# Patient Record
Sex: FEMALE | Race: WHITE | NOT HISPANIC OR LATINO | Employment: UNEMPLOYED | URBAN - METROPOLITAN AREA
[De-identification: names, ages, dates, MRNs, and addresses within clinical notes are randomized per-mention and may not be internally consistent; named-entity substitution may affect disease eponyms.]

---

## 2022-08-11 ENCOUNTER — HOSPITAL ENCOUNTER (EMERGENCY)
Facility: HOSPITAL | Age: 22
Discharge: HOME | End: 2022-08-11
Attending: SPECIALIST
Payer: MEDICAID

## 2022-08-11 VITALS
HEART RATE: 87 BPM | RESPIRATION RATE: 16 BRPM | OXYGEN SATURATION: 98 % | WEIGHT: 184 LBS | BODY MASS INDEX: 32.6 KG/M2 | DIASTOLIC BLOOD PRESSURE: 76 MMHG | TEMPERATURE: 99 F | SYSTOLIC BLOOD PRESSURE: 119 MMHG | HEIGHT: 63 IN

## 2022-08-11 DIAGNOSIS — Z3A.27 27 WEEKS GESTATION OF PREGNANCY: Primary | ICD-10-CM

## 2022-08-11 PROCEDURE — 99283 EMERGENCY DEPT VISIT LOW MDM: CPT

## 2022-08-11 RX ORDER — HYDROXYPROGESTERONE CAPROATE 250 MG/ML
250 INJECTION INTRAMUSCULAR
Status: ON HOLD | COMMUNITY
End: 2022-09-13

## 2022-08-12 PROBLEM — Z3A.27 27 WEEKS GESTATION OF PREGNANCY: Status: ACTIVE | Noted: 2022-08-12

## 2022-08-12 NOTE — ED PROVIDER NOTES
"       MARIANELA NOTE     Admit Date: 2022  MARIANELA Physician: Alan Botello  Primary OBGYN: Dr. Alan Ricketts    Admit Diagnosis/Chief Complaint: Abdominal Pain    Chief Complaint   Patient presents with    Abdominal Pain    lost mucous plug       Hospital Course:  Kvng Rodriguez is a 22 y.o.  at 27w5d presents complaining of lower abdominal pressure pain.  She reports the pain is not consistent and is mainly on her left lower quadrant.  She also reports passing some mucus which she was concerned was her mucous plug.    This IUP is complicated by history of  delivery at 28 weeks..    Patient denies vaginal bleeding, leakage of fluid, contractions, headache, vision changes, RUQ pain, dysuria, fever and nausea/vomiting.  Fetal Movement: normal.    Review of Systems    /76 (BP Location: Left arm, Patient Position: Lying)   Pulse 87   Temp 98.6 °F (37 °C) (Oral)   Resp 16   Ht 5' 3" (1.6 m)   Wt 83.5 kg (184 lb)   SpO2 98%   Breastfeeding No   BMI 32.59 kg/m²   Temp:  [98.6 °F (37 °C)] 98.6 °F (37 °C)  Pulse:  [87] 87  Resp:  [16] 16  SpO2:  [98 %] 98 %  BP: (119)/(76) 119/76    General: in no apparent distress in no respiratory distress and acyanotic alert oriented times 3  Cardiovascular: regular rate and rhythm no murmurs  Respiratory: clear to auscultation, no wheezes, rales or rhonchi, symmetric air entry  Abdominal: fundus soft, nontender 28 weeks size FHT present.  Minor discomfort in the suprapubic and left lower quadrant.  Back: lumbar tenderness absent CVA tenderness none  Extremeties no redness or tenderness in the calves or thighs no edema    SSE:   SVE:  The cervix is long thick and closed.  Trace of vaginal discharge is noted       FHT: Category 1  TOCO: .  No contractions are noted.    Medical Decision Making:  Patient reassured about the normal discomforts of 3rd trimester pregnancy.  Signs and symptoms of pre term labor have been discussed.  Patient expressed understanding " and all questions were answered to her satisfaction.    LABS:   No results found for this or any previous visit (from the past 24 hour(s)).  [unfilled]     Imaging Results    None          ASSESMENT: Kvng Rodriguez is a 22 y.o.   at 27w5d with abdominal pressure consistent with fetal presentation.    Discharge Diagnosis:  Pregnancy 27 weeks.  Abdominal pressure or round ligament pain.    Status:Improved/stable    Disposition:  discharged to home    Medications:   Will no new medications    Patient Instructions:   Current Discharge Medication List      CONTINUE these medications which have NOT CHANGED    Details   HYDROXYprogesterone (PERLA) 250 mg/mL (1 mL) Oil Inject 250 mg into the muscle every 7 days.      prenatal vit 10-iron fum-folic (VITAFOL-OB) 65-1 mg Tab Take 1 tab by mouth daily  Qty: 30 tablet, Refills: 11    Associated Diagnoses: Prenatal care, subsequent pregnancy, second trimester             - Pt was given routine pregnancy instructions including to return to triage if she had any vaginal bleeding (other than spotting for the next 48hrs), any loss of fluid like her water broke, decreased fetal movement, or contractions Q 5min lasting for 2 or more hours. Pt was also instructed to drink copious water. Patient voiced understanding of all these instructions and was subsequently discharged home.    She will follow up with her primary OB.    No discharge procedures on file.    Alan Botello MD  OB-GYN Hospitalist       Alan Botello MD  21

## 2022-08-15 ENCOUNTER — HOSPITAL ENCOUNTER (INPATIENT)
Facility: HOSPITAL | Age: 22
LOS: 29 days | Discharge: HOME | End: 2022-09-13
Attending: OBSTETRICS & GYNECOLOGY | Admitting: OBSTETRICS & GYNECOLOGY
Payer: MEDICAID

## 2022-08-15 DIAGNOSIS — O42.919 PRETERM PREMATURE RUPTURE OF MEMBRANES (PPROM) WITH UNKNOWN ONSET OF LABOR: ICD-10-CM

## 2022-08-15 PROBLEM — Z3A.27 27 WEEKS GESTATION OF PREGNANCY: Status: RESOLVED | Noted: 2022-08-12 | Resolved: 2022-08-15

## 2022-08-15 LAB
ALBUMIN SERPL-MCNC: 2.9 GM/DL (ref 3.5–5)
ALBUMIN/GLOB SERPL: 0.9 RATIO (ref 1.1–2)
ALP SERPL-CCNC: 95 UNIT/L (ref 40–150)
ALT SERPL-CCNC: 12 UNIT/L (ref 0–55)
APPEARANCE UR: CLEAR
AST SERPL-CCNC: 12 UNIT/L (ref 5–34)
BACTERIA #/AREA URNS AUTO: ABNORMAL /HPF
BASOPHILS # BLD AUTO: 0.03 X10(3)/MCL (ref 0–0.2)
BASOPHILS NFR BLD AUTO: 0.2 %
BILIRUB UR QL STRIP.AUTO: NEGATIVE MG/DL
BILIRUBIN DIRECT+TOT PNL SERPL-MCNC: 0.8 MG/DL
BUN SERPL-MCNC: 6.3 MG/DL (ref 7–18.7)
CALCIUM SERPL-MCNC: 9 MG/DL (ref 8.4–10.2)
CHLORIDE SERPL-SCNC: 108 MMOL/L (ref 98–107)
CO2 SERPL-SCNC: 21 MMOL/L (ref 22–29)
COLOR UR AUTO: ABNORMAL
CREAT SERPL-MCNC: 0.62 MG/DL (ref 0.55–1.02)
CTP QC/QA: YES
EOSINOPHIL # BLD AUTO: 0.25 X10(3)/MCL (ref 0–0.9)
EOSINOPHIL NFR BLD AUTO: 1.8 %
ERYTHROCYTE [DISTWIDTH] IN BLOOD BY AUTOMATED COUNT: 13.1 % (ref 11.5–17)
GFR SERPLBLD CREATININE-BSD FMLA CKD-EPI: >60 MLS/MIN/1.73/M2
GLOBULIN SER-MCNC: 3.2 GM/DL (ref 2.4–3.5)
GLUCOSE SERPL-MCNC: 73 MG/DL (ref 74–100)
GLUCOSE UR QL STRIP.AUTO: NEGATIVE MG/DL
GROUP & RH: NORMAL
HBV SURFACE AG SERPL QL IA: NONREACTIVE
HCT VFR BLD AUTO: 38.2 % (ref 37–47)
HGB BLD-MCNC: 12.8 GM/DL (ref 12–16)
IMM GRANULOCYTES # BLD AUTO: 0.06 X10(3)/MCL (ref 0–0.04)
IMM GRANULOCYTES NFR BLD AUTO: 0.4 %
INDIRECT COOMBS GEL: NORMAL
KETONES UR QL STRIP.AUTO: NEGATIVE MG/DL
LEUKOCYTE ESTERASE UR QL STRIP.AUTO: ABNORMAL UNIT/L
LYMPHOCYTES # BLD AUTO: 1.78 X10(3)/MCL (ref 0.6–4.6)
LYMPHOCYTES NFR BLD AUTO: 13 %
MCH RBC QN AUTO: 31.7 PG (ref 27–31)
MCHC RBC AUTO-ENTMCNC: 33.5 MG/DL (ref 33–36)
MCV RBC AUTO: 94.6 FL (ref 80–94)
MONOCYTES # BLD AUTO: 0.84 X10(3)/MCL (ref 0.1–1.3)
MONOCYTES NFR BLD AUTO: 6.2 %
NEUTROPHILS # BLD AUTO: 10.7 X10(3)/MCL (ref 2.1–9.2)
NEUTROPHILS NFR BLD AUTO: 78.4 %
NITRITE UR QL STRIP.AUTO: NEGATIVE
NRBC BLD AUTO-RTO: 0 %
PH UR STRIP.AUTO: 6.5 [PH]
PLATELET # BLD AUTO: 246 X10(3)/MCL (ref 130–400)
PMV BLD AUTO: 10.7 FL (ref 7.4–10.4)
POTASSIUM SERPL-SCNC: 4 MMOL/L (ref 3.5–5.1)
PRENATAL STREP B CULTURE: NORMAL
PROT SERPL-MCNC: 6.1 GM/DL (ref 6.4–8.3)
PROT UR QL STRIP.AUTO: ABNORMAL MG/DL
RBC # BLD AUTO: 4.04 X10(6)/MCL (ref 4.2–5.4)
RBC #/AREA URNS AUTO: <5 /HPF
RBC UR QL AUTO: NEGATIVE UNIT/L
RPR: NEGATIVE
RUPTURE OF MEMBRANE: POSITIVE
SODIUM SERPL-SCNC: 136 MMOL/L (ref 136–145)
SP GR UR STRIP.AUTO: 1.02 (ref 1–1.03)
SQUAMOUS #/AREA URNS AUTO: <5 /HPF
T PALLIDUM AB SER QL: NONREACTIVE
UROBILINOGEN UR STRIP-ACNC: 1 MG/DL
WBC # SPEC AUTO: 13.7 X10(3)/MCL (ref 4.5–11.5)
WBC #/AREA URNS AUTO: 61 /HPF

## 2022-08-15 PROCEDURE — 72100003 HC LABOR CARE, EA. ADDL. 8 HRS

## 2022-08-15 PROCEDURE — 27000492 HC SLEEVE, SCD T/L

## 2022-08-15 PROCEDURE — 80053 COMPREHEN METABOLIC PANEL: CPT | Performed by: OBSTETRICS & GYNECOLOGY

## 2022-08-15 PROCEDURE — 85025 COMPLETE CBC W/AUTO DIFF WBC: CPT | Performed by: OBSTETRICS & GYNECOLOGY

## 2022-08-15 PROCEDURE — 87077 CULTURE AEROBIC IDENTIFY: CPT | Performed by: OBSTETRICS & GYNECOLOGY

## 2022-08-15 PROCEDURE — 72100002 HC LABOR CARE, 1ST 8 HOURS

## 2022-08-15 PROCEDURE — 86850 RBC ANTIBODY SCREEN: CPT | Performed by: OBSTETRICS & GYNECOLOGY

## 2022-08-15 PROCEDURE — 25000003 PHARM REV CODE 250: Performed by: OBSTETRICS & GYNECOLOGY

## 2022-08-15 PROCEDURE — 87081 CULTURE SCREEN ONLY: CPT | Performed by: OBSTETRICS & GYNECOLOGY

## 2022-08-15 PROCEDURE — 36415 COLL VENOUS BLD VENIPUNCTURE: CPT | Performed by: OBSTETRICS & GYNECOLOGY

## 2022-08-15 PROCEDURE — 86780 TREPONEMA PALLIDUM: CPT | Performed by: OBSTETRICS & GYNECOLOGY

## 2022-08-15 PROCEDURE — 63700000 PHARM REV CODE 250 ALT 637 W/O HCPCS: Performed by: OBSTETRICS & GYNECOLOGY

## 2022-08-15 PROCEDURE — 87389 HIV-1 AG W/HIV-1&-2 AB AG IA: CPT | Performed by: OBSTETRICS & GYNECOLOGY

## 2022-08-15 PROCEDURE — 11000001 HC ACUTE MED/SURG PRIVATE ROOM

## 2022-08-15 PROCEDURE — 63600175 PHARM REV CODE 636 W HCPCS: Performed by: OBSTETRICS & GYNECOLOGY

## 2022-08-15 PROCEDURE — 51702 INSERT TEMP BLADDER CATH: CPT

## 2022-08-15 PROCEDURE — 81001 URINALYSIS AUTO W/SCOPE: CPT | Performed by: OBSTETRICS & GYNECOLOGY

## 2022-08-15 PROCEDURE — 87340 HEPATITIS B SURFACE AG IA: CPT | Performed by: OBSTETRICS & GYNECOLOGY

## 2022-08-15 PROCEDURE — 99285 EMERGENCY DEPT VISIT HI MDM: CPT | Mod: 25

## 2022-08-15 RX ORDER — MAGNESIUM SULFATE HEPTAHYDRATE 40 MG/ML
6 INJECTION, SOLUTION INTRAVENOUS ONCE
Status: COMPLETED | OUTPATIENT
Start: 2022-08-15 | End: 2022-08-15

## 2022-08-15 RX ORDER — SODIUM CHLORIDE, SODIUM LACTATE, POTASSIUM CHLORIDE, CALCIUM CHLORIDE 600; 310; 30; 20 MG/100ML; MG/100ML; MG/100ML; MG/100ML
INJECTION, SOLUTION INTRAVENOUS CONTINUOUS
Status: DISCONTINUED | OUTPATIENT
Start: 2022-08-15 | End: 2022-09-10

## 2022-08-15 RX ORDER — SODIUM CHLORIDE 0.9 % (FLUSH) 0.9 %
10 SYRINGE (ML) INJECTION
Status: DISCONTINUED | OUTPATIENT
Start: 2022-08-15 | End: 2022-09-13 | Stop reason: HOSPADM

## 2022-08-15 RX ORDER — MAGNESIUM SULFATE HEPTAHYDRATE 40 MG/ML
2 INJECTION, SOLUTION INTRAVENOUS CONTINUOUS
Status: DISCONTINUED | OUTPATIENT
Start: 2022-08-15 | End: 2022-09-01

## 2022-08-15 RX ORDER — AZITHROMYCIN 250 MG/1
1000 TABLET, FILM COATED ORAL DAILY
Status: DISCONTINUED | OUTPATIENT
Start: 2022-08-15 | End: 2022-08-16

## 2022-08-15 RX ORDER — HYDROXYPROGESTERONE CAPROATE 250 MG/ML
250 INJECTION INTRAMUSCULAR WEEKLY
Status: DISCONTINUED | OUTPATIENT
Start: 2022-08-18 | End: 2022-09-11

## 2022-08-15 RX ORDER — AMOXICILLIN 500 MG/1
500 CAPSULE ORAL EVERY 8 HOURS
Status: COMPLETED | OUTPATIENT
Start: 2022-08-17 | End: 2022-08-21

## 2022-08-15 RX ORDER — BETAMETHASONE SODIUM PHOSPHATE AND BETAMETHASONE ACETATE 3; 3 MG/ML; MG/ML
12 INJECTION, SUSPENSION INTRA-ARTICULAR; INTRALESIONAL; INTRAMUSCULAR; SOFT TISSUE EVERY 24 HOURS
Status: COMPLETED | OUTPATIENT
Start: 2022-08-15 | End: 2022-08-16

## 2022-08-15 RX ADMIN — AZITHROMYCIN MONOHYDRATE 1000 MG: 250 TABLET ORAL at 11:08

## 2022-08-15 RX ADMIN — MAGNESIUM SULFATE HEPTAHYDRATE 6 G: 40 INJECTION, SOLUTION INTRAVENOUS at 10:08

## 2022-08-15 RX ADMIN — AMPICILLIN SODIUM 2 G: 2 INJECTION, POWDER, FOR SOLUTION INTRAMUSCULAR; INTRAVENOUS at 11:08

## 2022-08-15 RX ADMIN — AMPICILLIN SODIUM 2 G: 2 INJECTION, POWDER, FOR SOLUTION INTRAMUSCULAR; INTRAVENOUS at 10:08

## 2022-08-15 RX ADMIN — AMPICILLIN SODIUM 2 G: 2 INJECTION, POWDER, FOR SOLUTION INTRAMUSCULAR; INTRAVENOUS at 04:08

## 2022-08-15 RX ADMIN — BETAMETHASONE ACETATE AND BETAMETHASONE SODIUM PHOSPHATE 12 MG: 3; 3 INJECTION, SUSPENSION INTRA-ARTICULAR; INTRALESIONAL; INTRAMUSCULAR; SOFT TISSUE at 10:08

## 2022-08-15 RX ADMIN — SODIUM CHLORIDE, POTASSIUM CHLORIDE, SODIUM LACTATE AND CALCIUM CHLORIDE: 600; 310; 30; 20 INJECTION, SOLUTION INTRAVENOUS at 11:08

## 2022-08-15 RX ADMIN — SODIUM CHLORIDE, POTASSIUM CHLORIDE, SODIUM LACTATE AND CALCIUM CHLORIDE: 600; 310; 30; 20 INJECTION, SOLUTION INTRAVENOUS at 10:08

## 2022-08-15 NOTE — ED PROVIDER NOTES
"       MARIANELA NOTE  [facility]     Admit Date: 8/15/2022  MARIANELA Physician: Trice Oh  Primary OBGYN: Dr. Alan Ricketts and mitra Ovalle MFM    Admit Diagnosis/Chief Complaint: Leakage of Fluid    Chief Complaint   Patient presents with    Rupture of Membranes     Iup at 28.2 with c/o leaking of fluid       Hospital Course:  Kvng Rodriguez is a 22 y.o.  at 28w2d presents complaining of LOF at 7am clear this am. Some abdominal discomfort, no severe pain, not described as contractions.   This IUP is complicated by hx 28 wk PTL/PTD, on dottie q Thurs, followed by MFM, BMI 32.    Patient denies vaginal bleeding, contractions, headache, vision changes, RUQ pain, dysuria, fever and nausea/vomiting.  Fetal Movement: normal.    /86   Pulse 97   Ht 5' 3" (1.6 m)   Wt 83.9 kg (185 lb)   Breastfeeding No   BMI 32.77 kg/m²   Pulse:  [97] 97  BP: (121)/(86) 121/86    General: in no apparent distress well developed and well nourished non-toxic in no respiratory distress and acyanotic alert oriented times 3 afebrile normal vitals  Cardiovascular: regular rate and rhythm no murmurs  Respiratory: unlabored  Abdominal: soft, nontender, nondistended, no abnormal masses, no epigastric pain obese FHT present  Back: lumbar tenderness absent CVA tenderness none suprapubic tenderness absent  Extremeties no redness or tenderness in the calves or thighs no edema    SVE (PeriWATCH)  Dilation (cm): 1  Effacement (%): 50  Station: -2  Examined by:: sumaya oh md  Simplified Michelle Score: 3     SSE neg pooling, neg valsalva, mucous dc noted, visually 1cm dilated and thick  +++ROMplus, vtx palpated on exam      EFM: Cat 1, 155 modBTV, +accel, no decel (reassuring, reactive)  TOCO: none      Medical Decision Making:      LABS:     Recent Results (from the past 24 hour(s))   POCT Rupture of membrane    Collection Time: 08/15/22  9:27 AM   Result Value Ref Range    Rupture of Membrane Positive (A) " Negative     Acceptable Yes        Imaging Results    None          ASSESMENT: Kvng Rodriguez is a 22 y.o.   at 28w2d with PPROM    Discharge Diagnosis:   Patient Active Problem List   Diagnosis     premature rupture of membranes (PPROM) with unknown onset of labor     Admit to primary OB, MFM/NICU consult  Latency abx, BMZ, Mag neuroppx  Continue dottie q Thurs, per Dr. Ovalle  Ultrasound ordered  Continue to closely monitor maternal/fetal status  Overall reassuring on NST  Answered pt questions to best of ability      Trice Oh MD  OB-GYN Hospitalist

## 2022-08-15 NOTE — H&P
[facility] - Ante[facility]  Obstetrics  History & Physical    Patient Name: Kvng Rodriguez  MRN: [id number]  Admission Date: 8/15/2022  Primary Care Provider: No primary care provider on file.    Subjective:     Principal Problem: premature rupture of membranes (PPROM) with unknown onset of labor    History of Present Illness: pprom     Obstetric HPI:  Patient reports None contractions, active fetal movement, Yes vaginal bleeding , Yes loss of fluid     This pregnancy has been complicated by hx of ptl   pprom    OB History    Para Term  AB Living   4 1 0 1 2 1   SAB IAB Ectopic Multiple Live Births   2 0 0 0 1      # Outcome Date GA Lbr Grupo/2nd Weight Sex Delivery Anes PTL Lv   4 Current            3 SAB 21        FD   2  19 28w0d   M Vag-Spont   MAGDALENO   1 2018        FD     History reviewed. No pertinent past medical history.  Past Surgical History:   Procedure Laterality Date    ADENOIDECTOMY      MYRINGOTOMY W/ TUBES      TONSILLECTOMY         PTA Medications   Medication Sig    HYDROXYprogesterone (PERLA) 250 mg/mL (1 mL) Oil Inject 250 mg into the muscle every 7 days.    prenatal vit 10-iron fum-folic (VITAFOL-OB) 65-1 mg Tab Take 1 tab by mouth daily       Review of patient's allergies indicates:  No Known Allergies     Family History     Problem Relation (Age of Onset)    COPD Mother    Diabetes Mother    Hypertension Son        Tobacco Use    Smoking status: Never Smoker    Smokeless tobacco: Never Used   Substance and Sexual Activity    Alcohol use: Not Currently    Drug use: Never    Sexual activity: Yes     Review of Systems   Objective:     Vital Signs (Most Recent):  Temp: 98 °F (36.7 °C) (08/15/22 1133)  Pulse: 82 (08/15/22 1140)  BP: (!) 87/59 (08/15/22 1140)  SpO2: 100 % (08/15/22 1133) Vital Signs (24h Range):  Temp:  [98 °F (36.7 °C)] 98 °F (36.7 °C)  Pulse:  [] 82  SpO2:  [97 %-100 %] 100 %  BP: ()/(51-86) 87/59      Weight: 83.9 kg (185 lb)  Body mass index is 32.77 kg/m².    FHT: 140Cat 1 (reassuring)  TOCO:  Q 0 minutes    Physical Exam    Cervix:  Dilation:  1  Effacement:  50%  Station: -3  Presentation: Vertex     Significant Labs:  Lab Results   Component Value Date    GROUPTRH O POS 08/15/2022       I have personallly reviewed all pertinent lab results from the last 24 hours.    Assessment/Plan:     22 y.o. female  at 28w2d for:    Active Diagnoses:    Diagnosis Date Noted POA    PRINCIPAL PROBLEM:   premature rupture of membranes (PPROM) with unknown onset of labor [O42.919] 08/15/2022 Yes      Problems Resolved During this Admission:       Admit to [facility]  mfm consult  Steroids  abx  Monitoring  Notify nicu    Alan Ricketts MD  Obstetrics  [facility]

## 2022-08-15 NOTE — PROGRESS NOTES
08/15/22 1155   [facility] [place] Note - Strip   Strip Reviewed by [place] Nurse? Yes   [facility] [place] Note - Communication   [place] Nurse Communicated with Bedside Nurse Regarding: Fetal Status   [facility] [place] Note - Notification   Nurse Notified? Yes  (nurse at bedside)   Name of Nurse Delacruz

## 2022-08-15 NOTE — PROCEDURE NOTE ADDENDUM
LIMITED OBSTETRICAL ULTRASOUND AND BIOPHYSICAL PROFILE REPORT    DATE OF ULTRASOUND: 08/15/2022     INDICATION: PROM     Gestational Age: 28w2d     Limited obstetrical ultrasound was done that showed fetus to be in cephalic presentation longitudinal lie.  Placenta was anterior.  BPD was 7.2 cm at 29 weeks, head circumference 25.8 cm at 28 weeks, abdominal circumference was 24 cm at 28 weeks 2 days, and femur length was 5.5 cm at 29 weeks.  Estimated fetal weight of 1223 g (2 lb 12 oz is at the 55th percentile).  ЕЛЕНА was normal at 5.7 with deepest pocket of 2.7 cm.    BPP was done that showed normal fetal movements, tone, fluid, and no breathing with a biophysical profile of 6/8.    Impression:      28 weeks and 2 days gestation with normal fetal growth and low-normal amniotic fluid volume.    Biophysical profile is 6/8.           Parmjit Ovalle MD       Components of this note were documented using voice recognition systems; and are subject to errors not corrected at proof reading. Please contact author for any clarifications.'

## 2022-08-16 LAB
RUBV IGG SERPL IA-ACNC: 3.1
RUBV IGG SERPL QL IA: POSITIVE

## 2022-08-16 PROCEDURE — 63600175 PHARM REV CODE 636 W HCPCS: Performed by: OBSTETRICS & GYNECOLOGY

## 2022-08-16 PROCEDURE — 25000003 PHARM REV CODE 250: Performed by: NURSE PRACTITIONER

## 2022-08-16 PROCEDURE — 11000001 HC ACUTE MED/SURG PRIVATE ROOM

## 2022-08-16 PROCEDURE — 72100003 HC LABOR CARE, EA. ADDL. 8 HRS

## 2022-08-16 PROCEDURE — 25000003 PHARM REV CODE 250: Performed by: OBSTETRICS & GYNECOLOGY

## 2022-08-16 RX ORDER — ASPIRIN 81 MG/1
81 TABLET ORAL DAILY
Status: DISCONTINUED | OUTPATIENT
Start: 2022-08-16 | End: 2022-09-11

## 2022-08-16 RX ORDER — AZITHROMYCIN 250 MG/1
1000 TABLET, FILM COATED ORAL DAILY
Status: DISCONTINUED | OUTPATIENT
Start: 2022-08-19 | End: 2022-08-22

## 2022-08-16 RX ADMIN — AMPICILLIN SODIUM 2 G: 2 INJECTION, POWDER, FOR SOLUTION INTRAMUSCULAR; INTRAVENOUS at 04:08

## 2022-08-16 RX ADMIN — BETAMETHASONE ACETATE AND BETAMETHASONE SODIUM PHOSPHATE 12 MG: 3; 3 INJECTION, SUSPENSION INTRA-ARTICULAR; INTRALESIONAL; INTRAMUSCULAR; SOFT TISSUE at 10:08

## 2022-08-16 RX ADMIN — AMPICILLIN SODIUM 2 G: 2 INJECTION, POWDER, FOR SOLUTION INTRAMUSCULAR; INTRAVENOUS at 10:08

## 2022-08-16 RX ADMIN — AMPICILLIN SODIUM 2 G: 2 INJECTION, POWDER, FOR SOLUTION INTRAMUSCULAR; INTRAVENOUS at 05:08

## 2022-08-16 RX ADMIN — ASPIRIN 81 MG: 81 TABLET, COATED ORAL at 09:08

## 2022-08-16 RX ADMIN — SODIUM CHLORIDE, POTASSIUM CHLORIDE, SODIUM LACTATE AND CALCIUM CHLORIDE: 600; 310; 30; 20 INJECTION, SOLUTION INTRAVENOUS at 05:08

## 2022-08-16 RX ADMIN — AMPICILLIN SODIUM 2 G: 2 INJECTION, POWDER, FOR SOLUTION INTRAMUSCULAR; INTRAVENOUS at 11:08

## 2022-08-16 NOTE — PROGRESS NOTES
Pt seen this am no complaints. +lof reports good FM   Denies bleeding  vss afebrile  aao3  abd gravid nt   Ext tn    nst cat one  toco quiet      iup 28-3 pprom  Stable cw abx  Monitoring  Appreciate Dr Ovalle

## 2022-08-16 NOTE — CONSULTS
Kvng Rodriguez is a 22 y.o.  female  at 28w3d  gestation who was admitted to antepartum with  premature rupture of membranes that occurred on 8-15-22. She is known patient to [facility] being followed for history of PTD. She is on weekly [place]. She is s/p magnesium sulfate for fetal neuroprotection. She received first celestone yesterday. She is on IV antibiotics currently. She reports good fetal movement. She reports feeling about one contraction per hour and some clear fluid. She denies any vaginal bleeding.      Review of Systems   Constitutional: Negative.    Eyes: Negative.    Respiratory: Negative.    Cardiovascular: Negative.    Gastrointestinal: Negative.    Endocrine: Negative.    Genitourinary: Negative.    Musculoskeletal: Negative.    Skin: Negative.    Allergic/Immunologic: Negative.    Neurological: Negative.    Hematological: Negative.    Psychiatric/Behavioral: Negative.        History reviewed. No pertinent past medical history.   Past Surgical History:   Procedure Laterality Date    ADENOIDECTOMY      MYRINGOTOMY W/ TUBES      TONSILLECTOMY        Social History     Socioeconomic History    Marital status: Single   Tobacco Use    Smoking status: Never Smoker    Smokeless tobacco: Never Used   Substance and Sexual Activity    Alcohol use: Not Currently    Drug use: Never    Sexual activity: Yes      Family History   Problem Relation Age of Onset    Diabetes Mother     COPD Mother     Hypertension Son       Review of patient's allergies indicates:  No Known Allergies   Prior to Admission medications    Medication Sig Start Date End Date Taking? Authorizing Provider   HYDROXYprogesterone (PERLA) 250 mg/mL (1 mL) Oil Inject 250 mg into the muscle every 7 days.    Historical Provider   prenatal vit 10-iron fum-folic (VITAFOL-OB) 65-1 mg Tab Take 1 tab by mouth daily 5/3/22   Alan Ricketts MD            Vitals:    08/15/22 2130 08/15/22 2300 22 0130 22 0430   BP:  "126/74   (!) 100/58   Pulse: 91   75   Resp:       Temp:  98.2 °F (36.8 °C) 97.9 °F (36.6 °C) 97.9 °F (36.6 °C)   SpO2:    100%   Weight:       Height:        No LMP recorded. Patient is pregnant.   HT: 5' 3" (160 cm)  WT: 83.9 kg (185 lb)  BMI: 32.8     Temp:  [97.9 °F (36.6 °C)-98.5 °F (36.9 °C)] 97.9 °F (36.6 °C)  Pulse:  [0-163] 75  Resp:  [18] 18  SpO2:  [93 %-100 %] 100 %  BP: ()/(51-86) 100/58  Physical Exam  Constitutional:       General: She is not in acute distress.  Cardiovascular:      Rate and Rhythm: Normal rate and regular rhythm.      Pulses: Normal pulses.      Heart sounds: Normal heart sounds.   Pulmonary:      Effort: Pulmonary effort is normal. No respiratory distress.      Breath sounds: Normal breath sounds.   Abdominal:      Palpations: Abdomen is soft.      Tenderness: There is no abdominal tenderness.      Comments: Gravid uterus, no uterine tenderness   Musculoskeletal:      Right lower leg: No edema.      Left lower leg: No edema.   Skin:     General: Skin is warm and dry.      Capillary Refill: Capillary refill takes less than 2 seconds.   Neurological:      General: No focal deficit present.      Mental Status: She is alert and oriented to person, place, and time.   Psychiatric:         Mood and Affect: Mood normal.         Behavior: Behavior normal.         Thought Content: Thought content normal.       Recent Results (from the past 48 hour(s))   RPR    Collection Time: 08/15/22 12:00 AM   Result Value Ref Range    RPR Negative    Strep B Screen, Vaginal / Rectal    Collection Time: 08/15/22 12:00 AM    Specimen: Vaginal/Rectal   Result Value Ref Range    Strep B Culture Pending    POCT Rupture of membrane    Collection Time: 08/15/22  9:27 AM   Result Value Ref Range    Rupture of Membrane Positive (A) Negative     Acceptable Yes    Strep Only Culture    Collection Time: 08/15/22  9:58 AM    Specimen: Vagina   Result Value Ref Range    Strep Only Culture No growth " of Beta Strep    Urinalysis    Collection Time: 08/15/22  9:58 AM   Result Value Ref Range    Color, UA Dark Yellow (A) Yellow, Colorless, Other, Clear    Appearance, UA Clear Clear    Specific Gravity, UA 1.023 1.001 - 1.030    pH, UA 6.5 5.0, 5.5, 6.0, 6.5, 7.0, 7.5, 8.0, 8.5    Protein, UA Trace (A) Negative, 300  mg/dL    Glucose, UA Negative Negative, Normal mg/dL    Ketones, UA Negative Negative, +1, +2, +3, +4, +5, >=160, >=80 mg/dL    Blood, UA Negative Negative unit/L    Bilirubin, UA Negative Negative mg/dL    Urobilinogen, UA 1.0 0.2, 1.0, Normal mg/dL    Nitrites, UA Negative Negative    Leukocyte Esterase, UA 2+ (A) Negative, 75  unit/L   Urine Culture High Risk    Collection Time: 08/15/22  9:58 AM    Specimen: Urine, Clean Catch   Result Value Ref Range    Urine Culture No Growth At 24 Hours    Comprehensive Metabolic Panel    Collection Time: 08/15/22  9:58 AM   Result Value Ref Range    Sodium Level 136 136 - 145 mmol/L    Potassium Level 4.0 3.5 - 5.1 mmol/L    Chloride 108 (H) 98 - 107 mmol/L    Carbon Dioxide 21 (L) 22 - 29 mmol/L    Glucose Level 73 (L) 74 - 100 mg/dL    Blood Urea Nitrogen 6.3 (L) 7.0 - 18.7 mg/dL    Creatinine 0.62 0.55 - 1.02 mg/dL    Calcium Level Total 9.0 8.4 - 10.2 mg/dL    Protein Total 6.1 (L) 6.4 - 8.3 gm/dL    Albumin Level 2.9 (L) 3.5 - 5.0 gm/dL    Globulin 3.2 2.4 - 3.5 gm/dL    Albumin/Globulin Ratio 0.9 (L) 1.1 - 2.0 ratio    Bilirubin Total 0.8 <=1.5 mg/dL    Alkaline Phosphatase 95 40 - 150 unit/L    Alanine Aminotransferase 12 0 - 55 unit/L    Aspartate Aminotransferase 12 5 - 34 unit/L    eGFR >60 mls/min/1.73/m2   Type & Screen    Collection Time: 08/15/22  9:58 AM   Result Value Ref Range    Group & Rh O POS     Indirect [place] GEL NEG    CBC with Differential    Collection Time: 08/15/22  9:58 AM   Result Value Ref Range    WBC 13.7 (H) 4.5 - 11.5 x10(3)/mcL    RBC 4.04 (L) 4.20 - 5.40 x10(6)/mcL    Hgb 12.8 12.0 - 16.0 gm/dL    Hct 38.2 37.0 - 47.0 %     MCV 94.6 (H) 80.0 - 94.0 fL    MCH 31.7 (H) 27.0 - 31.0 pg    MCHC 33.5 33.0 - 36.0 mg/dL    RDW 13.1 11.5 - 17.0 %    Platelet 246 130 - 400 x10(3)/mcL    MPV 10.7 (H) 7.4 - 10.4 fL    Neut % 78.4 %    Lymph % 13.0 %    Mono % 6.2 %    Eos % 1.8 %    Basophil % 0.2 %    Lymph # 1.78 0.6 - 4.6 x10(3)/mcL    Neut # 10.7 (H) 2.1 - 9.2 x10(3)/mcL    Mono # 0.84 0.1 - 1.3 x10(3)/mcL    Eos # 0.25 0 - 0.9 x10(3)/mcL    Baso # 0.03 0 - 0.2 x10(3)/mcL    IG# 0.06 (H) 0 - 0.04 x10(3)/mcL    IG% 0.4 %    NRBC% 0.0 %   Urinalysis, Microscopic    Collection Time: 08/15/22  9:58 AM   Result Value Ref Range    RBC, UA <5 <=5 /HPF    WBC, UA 61 (H) <=5 /HPF    Squamous Epithelial Cells, UA <5 <=5 /HPF    Bacteria, UA None Seen None Seen, Rare, Occasional /HPF   SYPHILIS ANTIBODY (WITH REFLEX RPR)    Collection Time: 08/15/22 11:06 AM   Result Value Ref Range    Syphilis Antibody Nonreactive Nonreactive, Equivocal   Hepatitis B Surface Antigen    Collection Time: 08/15/22 11:06 AM   Result Value Ref Range    Hepatitis B Surface Antigen Nonreactive Nonreactive      US OB Non Rad Limited 1 Or More Gestations  See Provider Notes for results.     IMPRESSION: Please see provider notes for interpretation    This procedure was auto-finalized by: Virtual Radiologist  US OB Non Rad 14+ Weeks TransAbd, w/Biophysical Profile, w/o NST (xpd)  See Provider Notes for results.     IMPRESSION: Please see provider notes for interpretation    This procedure was auto-finalized by: Virtual Radiologist      Assessment and Plan  28w3d  premature rupture of membranes   baseline, moderate variability, 15x15 accels, infrequent variable decels, no contractions  Discussed with her the plan of care with broad spectrum antibiotics to prolong the latent phase, steroids to enhance fetal lunch maturity and magnesium sulfate for neuroprotection. Explained that delivery is planned at 34 weeks unless required earlier for onset of labor, nonreassuring  FHTs, evidence of chorioamnionitis, placental abruption or cord prolapse. Patient was advised that immediately delivery carries with it a higher risk of  morbidity/mortality than expectant management, understanding the risk of fetal demise in uterus, even in hospital secondary to occurrences such as placental abruption or cord prolapse that could not be picked up in a timely fashion to save the child. However, immediate delivery carries much higher risk than the small risk of such occurrence. The patient is in agreement with plan of expectant management.    Second celestone due today. Antibiotics per protocol.   BPP yesterday with preliminary read of 6/8 (0 breathing) and Estimated fetal weight of 1223 g (2 lb 12 oz is at the 55th percentile) Will do biophysical profile daily at this time. Continuous fetal monitoring at this time.     VTE prophylaxis  Discussed risks of DVT with prolonged bedrest. Discussed risks/benefits of SCD use vs frequent ROM and exercise of BLE while in bed to decrease risk of DVT.  Agreed to do SCDs  NICU consult pending      I examined the patient around 8:00 a.m..  It was no uterine tenderness.  Biophysical profile read later was 6/10.  Will keep on continuous monitoring.  Explained to the patient that historically delivery was done with premature rupture membranes at 34 weeks gestation.  She had her that recent data suggests that could wait beyond 34 weeks, with improved fetal benefits that has to be weighed against the risk of infection and bleeding.  with biophysical profile 6/10 and decreasing ЕЛЕНА at 4 today, there is risk of infection likely need for earlier delivery.  However there is no clinical evidence of infection at this time with no uterine tenderness continue daily testing and continuous monitoring and reassess daily    Components of this note were documented using voice recognition systems; and are subject to errors not corrected at proof reading.  Please contact the  author for any clarifications.

## 2022-08-16 NOTE — PROCEDURE NOTE ADDENDUM
BIOPHYSICAL PROFILE  REPORT    DATE OF ULTRASOUND: 2022     INDICATION: PROM    Gestational Age: 28w3d     NST:  2/2 125 beats per minute during rounds with accelerations of 15 x 15  Movement: 2/2  Tone:  2/2  Fluid:  0/2  Breathin/2    FHR:  125 bpm  during ultrasound    ЕЛЕНА:  4.0    Presentation: Cephalic        Impression:  Biophysical profile 6/10 with oligohydramnios        Parmjit Ovalle MD       Components of this note were documented using voice recognition systems; and are subject to errors not corrected at proof reading. Please contact author for any clarifications.

## 2022-08-17 LAB — BACTERIA SPEC CULT: NORMAL

## 2022-08-17 PROCEDURE — 25000003 PHARM REV CODE 250: Performed by: OBSTETRICS & GYNECOLOGY

## 2022-08-17 PROCEDURE — 25000003 PHARM REV CODE 250: Performed by: NURSE PRACTITIONER

## 2022-08-17 PROCEDURE — 11000001 HC ACUTE MED/SURG PRIVATE ROOM

## 2022-08-17 PROCEDURE — 63600175 PHARM REV CODE 636 W HCPCS: Performed by: OBSTETRICS & GYNECOLOGY

## 2022-08-17 PROCEDURE — 72100003 HC LABOR CARE, EA. ADDL. 8 HRS

## 2022-08-17 RX ADMIN — AMOXICILLIN 500 MG: 500 CAPSULE ORAL at 06:08

## 2022-08-17 RX ADMIN — AMOXICILLIN 500 MG: 500 CAPSULE ORAL at 08:08

## 2022-08-17 RX ADMIN — AMPICILLIN SODIUM 2 G: 2 INJECTION, POWDER, FOR SOLUTION INTRAMUSCULAR; INTRAVENOUS at 05:08

## 2022-08-17 RX ADMIN — AMOXICILLIN 500 MG: 500 CAPSULE ORAL at 01:08

## 2022-08-17 RX ADMIN — ASPIRIN 81 MG: 81 TABLET, COATED ORAL at 09:08

## 2022-08-17 NOTE — PROGRESS NOTES
22 year old  at 28 4/7 weeks gestation, currently admitted with  premature rupture of membranes. She is s/p Celstone x 2 on 8-15 and 22 and s/p magnesium sulfate for neuroprotection. She has history of PTD in last pregnancy at 28 weeks. She is on weekly Perla (on ). She is on prophylactic baby aspirin for preeclampsia prophylaxis. Reports still leaking clear fluid; also reports cramps every 10 minutes or so.  Denies vaginal bleeding, headaches, visual disturbances, epigastric pain, decreased fetal movement.    Review of Systems   Constitutional: Negative for fever.   Eyes: Negative for visual disturbance.   Respiratory: Negative for shortness of breath.    Cardiovascular: Negative for chest pain.   Gastrointestinal: Negative for abdominal pain.   Genitourinary: Negative for vaginal bleeding.   Neurological: Negative for headaches.     [unfilled]    History reviewed. No pertinent past medical history.   Past Surgical History:   Procedure Laterality Date    ADENOIDECTOMY      MYRINGOTOMY W/ TUBES      TONSILLECTOMY        Social History     Socioeconomic History    Marital status: Single   Tobacco Use    Smoking status: Never Smoker    Smokeless tobacco: Never Used   Substance and Sexual Activity    Alcohol use: Not Currently    Drug use: Never    Sexual activity: Yes      Family History   Problem Relation Age of Onset    Diabetes Mother     COPD Mother     Hypertension Son       Review of patient's allergies indicates:  No Known Allergies   Prior to Admission medications    Medication Sig Start Date End Date Taking? Authorizing Provider   HYDROXYprogesterone (PERLA) 250 mg/mL (1 mL) Oil Inject 250 mg into the muscle every 7 days.    Historical Provider   prenatal vit 10-iron fum-folic (VITAFOL-OB) 65-1 mg Tab Take 1 tab by mouth daily 5/3/22   Alan Ricketts MD            Vitals:    22 1910 22 2240 22 0335 22 0701   BP: 110/61 (!) 91/57 (!) 85/47  "   Pulse: 77 70 62    Resp: 18  16    Temp: 98.2 °F (36.8 °C) 97.9 °F (36.6 °C) 98.2 °F (36.8 °C) 98.3 °F (36.8 °C)   TempSrc:    Oral   SpO2: 98% 97% 97%    Weight:       Height:        No LMP recorded. Patient is pregnant.   HT: 5' 3" (160 cm)  WT: 83.9 kg (185 lb)  BMI: 32.8     Temp:  [97.8 °F (36.6 °C)-98.8 °F (37.1 °C)] 98.3 °F (36.8 °C)  Pulse:  [62-96] 62  Resp:  [16-18] 16  SpO2:  [97 %-100 %] 97 %  BP: ()/(47-74) 85/47  Physical Exam  Constitutional:       Appearance: Normal appearance.   Cardiovascular:      Rate and Rhythm: Normal rate.   Pulmonary:      Effort: Pulmonary effort is normal.   Abdominal:      Palpations: Abdomen is soft.      Comments: Nontender, gravid uterus   Musculoskeletal:         General: Normal range of motion.   Skin:     General: Skin is warm and dry.   Neurological:      Mental Status: She is alert and oriented to person, place, and time.   Psychiatric:         Mood and Affect: Mood normal.       Recent Results (from the past 48 hour(s))   POCT Rupture of membrane    Collection Time: 08/15/22  9:27 AM   Result Value Ref Range    Rupture of Membrane Positive (A) Negative     Acceptable Yes    Strep Only Culture    Collection Time: 08/15/22  9:58 AM    Specimen: Vagina   Result Value Ref Range    Strep Only Culture No growth of Beta Strep    Urinalysis    Collection Time: 08/15/22  9:58 AM   Result Value Ref Range    Color, UA Dark Yellow (A) Yellow, Colorless, Other, Clear    Appearance, UA Clear Clear    Specific Gravity, UA 1.023 1.001 - 1.030    pH, UA 6.5 5.0, 5.5, 6.0, 6.5, 7.0, 7.5, 8.0, 8.5    Protein, UA Trace (A) Negative, 300  mg/dL    Glucose, UA Negative Negative, Normal mg/dL    Ketones, UA Negative Negative, +1, +2, +3, +4, +5, >=160, >=80 mg/dL    Blood, UA Negative Negative unit/L    Bilirubin, UA Negative Negative mg/dL    Urobilinogen, UA 1.0 0.2, 1.0, Normal mg/dL    Nitrites, UA Negative Negative    Leukocyte Esterase, UA 2+ (A) Negative, " 75  unit/L   Urine Culture High Risk    Collection Time: 08/15/22  9:58 AM    Specimen: Urine, Clean Catch   Result Value Ref Range    Urine Culture No Growth At 24 Hours    Comprehensive Metabolic Panel    Collection Time: 08/15/22  9:58 AM   Result Value Ref Range    Sodium Level 136 136 - 145 mmol/L    Potassium Level 4.0 3.5 - 5.1 mmol/L    Chloride 108 (H) 98 - 107 mmol/L    Carbon Dioxide 21 (L) 22 - 29 mmol/L    Glucose Level 73 (L) 74 - 100 mg/dL    Blood Urea Nitrogen 6.3 (L) 7.0 - 18.7 mg/dL    Creatinine 0.62 0.55 - 1.02 mg/dL    Calcium Level Total 9.0 8.4 - 10.2 mg/dL    Protein Total 6.1 (L) 6.4 - 8.3 gm/dL    Albumin Level 2.9 (L) 3.5 - 5.0 gm/dL    Globulin 3.2 2.4 - 3.5 gm/dL    Albumin/Globulin Ratio 0.9 (L) 1.1 - 2.0 ratio    Bilirubin Total 0.8 <=1.5 mg/dL    Alkaline Phosphatase 95 40 - 150 unit/L    Alanine Aminotransferase 12 0 - 55 unit/L    Aspartate Aminotransferase 12 5 - 34 unit/L    eGFR >60 mls/min/1.73/m2   Type & Screen    Collection Time: 08/15/22  9:58 AM   Result Value Ref Range    Group & Rh O POS     Indirect [place] GEL NEG    CBC with Differential    Collection Time: 08/15/22  9:58 AM   Result Value Ref Range    WBC 13.7 (H) 4.5 - 11.5 x10(3)/mcL    RBC 4.04 (L) 4.20 - 5.40 x10(6)/mcL    Hgb 12.8 12.0 - 16.0 gm/dL    Hct 38.2 37.0 - 47.0 %    MCV 94.6 (H) 80.0 - 94.0 fL    MCH 31.7 (H) 27.0 - 31.0 pg    MCHC 33.5 33.0 - 36.0 mg/dL    RDW 13.1 11.5 - 17.0 %    Platelet 246 130 - 400 x10(3)/mcL    MPV 10.7 (H) 7.4 - 10.4 fL    Neut % 78.4 %    Lymph % 13.0 %    Mono % 6.2 %    Eos % 1.8 %    Basophil % 0.2 %    Lymph # 1.78 0.6 - 4.6 x10(3)/mcL    Neut # 10.7 (H) 2.1 - 9.2 x10(3)/mcL    Mono # 0.84 0.1 - 1.3 x10(3)/mcL    Eos # 0.25 0 - 0.9 x10(3)/mcL    Baso # 0.03 0 - 0.2 x10(3)/mcL    IG# 0.06 (H) 0 - 0.04 x10(3)/mcL    IG% 0.4 %    NRBC% 0.0 %   Urinalysis, Microscopic    Collection Time: 08/15/22  9:58 AM   Result Value Ref Range    RBC, UA <5 <=5 /HPF    WBC, UA 61 (H) <=5  /HPF    Squamous Epithelial Cells, UA <5 <=5 /HPF    Bacteria, UA None Seen None Seen, Rare, Occasional /HPF   SYPHILIS ANTIBODY (WITH REFLEX RPR)    Collection Time: 08/15/22 11:06 AM   Result Value Ref Range    Syphilis Antibody Nonreactive Nonreactive, Equivocal   Hepatitis B Surface Antigen    Collection Time: 08/15/22 11:06 AM   Result Value Ref Range    Hepatitis B Surface Antigen Nonreactive Nonreactive   Rubella Antibody, IgG    Collection Time: 08/15/22 11:06 AM   Result Value Ref Range    Rubella Ab, IgG, S Positive     Rubella IgG Antibody Index 3.1       US OB Non Rad 14+ Weeks TransAbd, w/Biophysical Profile, w/o NST (xpd)  See Provider Notes for results.     IMPRESSION: Please see provider notes for interpretation    This procedure was auto-finalized by: Virtual Radiologist        Assessment and Plan  28w4d  premature rupture of membranes.  Will continue broad spectrum antibiotics to prolong latent phase.  BPP yesterday was 6/10, will repeat this am. Will also get UAD. Fetal tracing 130s baseline, intermittent decelerations noted, lowest with sienna around 90s, 10x10 accelerations noted between decelerations. Decelerations were more frequent but seem to be decreasing in frequency; position was changed and IV bolus going at this time. Will continue to monitor continuously and place patient NPO. If worsening fetal status, or labor, will need to proceed with delivery.    -VTE prophylaxis: Continue same management      I examined this patient around 8:30 a.m..  She was having less frequent decelerations and a little earlier that morning.  She was then having contractions every 10 minutes.Impression:  Biophysical profile 8/10 with Abnormal umbilical artery doppler with elevated S/D ratio with intermittent decelerations on prolonged monitoring.  The patient abnormal Doppler and deceleration suggest likely more significant fetal growth restriction.  Reviewed the images from few days ago and the baby may  have been over measured.  Will plan to repeat that limited ultrasound biophysical and Doppler tomorrow.  We kept her NPO for a little bit.  I got a call by nurse taking care of patient around 10:00 a.m. with no more significant decelerations and no contractions seen.  When the patient eat and keep her on continuous monitoring and repeat ultrasound tomorrow..  Components of this note were documented using voice recognition systems; and are subject to errors not corrected at proof reading.  Please contact the author for any clarifications.

## 2022-08-17 NOTE — PROCEDURE NOTE ADDENDUM
BIOPHYSICAL PROFILE AND UMBILICAL ARTERY DOPPLER REPORT    DATE OF ULTRASOUND: 2022     INDICATION: PROM and oligohydramnios    Gestational Age: 28w4d     NST:  2/2 baseline heart rate during morning rounds was around 130 with 10 x 10 accelerations  Movement: 2/2  Tone:  2/2  Fluid:  0/2  Breathin/2    FHR:  152, 145, bpm during ultrasound    ЕЛЕНА:  2.1    Presentation: Cephalic    Umbilical artery Doppler was done that showed abnormal flow with elevated S/D ratio of 4.2-5.1    Impression:  Biophysical profile 8/10 with Abnormal umbilical artery doppler with elevated S/D ratio with intermittent decelerations on prolonged monitoring.        Parmjit Ovalle MD       Components of this note were documented using voice recognition systems; and are subject to errors not corrected at proof reading. Please contact author for any clarifications.

## 2022-08-17 NOTE — PROGRESS NOTES
[facility]                       [address] [id number]    PATIENT NAME:       ALEXANDER RODRIGUEZ   YOB: 2000  CSN:                [id number]   MRN:                [id number]  ADMIT DATE:         08/15/2022 09:18:00  PHYSICIAN:          Alan Ricketts MD                            PROGRESS NOTE    DATE:      Ms. Rodriguez was seen at the bedside this morning resting comfortably.  She   denies headaches, visual changes, chest pain, shortness of breath.  She denies   abdominal pain.  She reports further leakage of fluid.  Denies vaginal bleeding   and she denies contractions.  She reports good fetal movement.  She denies   fevers and chills.    PHYSICAL EXAMINATION:  VITAL SIGNS:  Stable.  She is afebrile.  GENERAL:  She is alert, oriented x3.  ABDOMEN:  Gravid, nontender, nondistended.  No guarding.  No rebound.    EXTREMITIES:  Nontender.      NST at this time is category 1 with moderate variability.  No contractions   noted.    ASSESSMENT AND PLAN:  This is a 22-year-old  4, para 0-1-2-1, 28 weeks 4   days, with PPROM, currently stable.    PLAN:  Continue with expectant management.  Biophysical profile this morning.    The patient may take a quick shower after the BPP.  Dr. Parmjit Ovalle is helping   manage this patient.  I appreciate his help.        ______________________________  Alan Ricketts MD    EPE/AQS  DD:  2022  Time:  07:13AM  DT:  2022  Time:  08:53AM  Job #:  [id number]      PROGRESS NOTE

## 2022-08-18 LAB
BASOPHILS # BLD AUTO: 0.02 X10(3)/MCL (ref 0–0.2)
BASOPHILS NFR BLD AUTO: 0.2 %
EOSINOPHIL # BLD AUTO: 0.03 X10(3)/MCL (ref 0–0.9)
EOSINOPHIL NFR BLD AUTO: 0.3 %
ERYTHROCYTE [DISTWIDTH] IN BLOOD BY AUTOMATED COUNT: 13.2 % (ref 11.5–17)
HCT VFR BLD AUTO: 33.4 % (ref 37–47)
HGB BLD-MCNC: 11 GM/DL (ref 12–16)
HIV 1+2 AB+HIV1 P24 AG SERPL QL IA: NONREACTIVE
IMM GRANULOCYTES # BLD AUTO: 0.12 X10(3)/MCL (ref 0–0.04)
IMM GRANULOCYTES NFR BLD AUTO: 1 %
LYMPHOCYTES # BLD AUTO: 2.27 X10(3)/MCL (ref 0.6–4.6)
LYMPHOCYTES NFR BLD AUTO: 19.1 %
MCH RBC QN AUTO: 31.8 PG (ref 27–31)
MCHC RBC AUTO-ENTMCNC: 32.9 MG/DL (ref 33–36)
MCV RBC AUTO: 96.5 FL (ref 80–94)
MONOCYTES # BLD AUTO: 0.99 X10(3)/MCL (ref 0.1–1.3)
MONOCYTES NFR BLD AUTO: 8.3 %
NEUTROPHILS # BLD AUTO: 8.5 X10(3)/MCL (ref 2.1–9.2)
NEUTROPHILS NFR BLD AUTO: 71.1 %
NRBC BLD AUTO-RTO: 0 %
PLATELET # BLD AUTO: 213 X10(3)/MCL (ref 130–400)
PMV BLD AUTO: 10.2 FL (ref 7.4–10.4)
PRENATAL STREP B CULTURE: NEGATIVE
RBC # BLD AUTO: 3.46 X10(6)/MCL (ref 4.2–5.4)
WBC # SPEC AUTO: 11.9 X10(3)/MCL (ref 4.5–11.5)

## 2022-08-18 PROCEDURE — 63600175 PHARM REV CODE 636 W HCPCS: Mod: JG | Performed by: OBSTETRICS & GYNECOLOGY

## 2022-08-18 PROCEDURE — 25000003 PHARM REV CODE 250: Performed by: NURSE PRACTITIONER

## 2022-08-18 PROCEDURE — 11000001 HC ACUTE MED/SURG PRIVATE ROOM

## 2022-08-18 PROCEDURE — 72100003 HC LABOR CARE, EA. ADDL. 8 HRS

## 2022-08-18 PROCEDURE — 36415 COLL VENOUS BLD VENIPUNCTURE: CPT | Performed by: OBSTETRICS & GYNECOLOGY

## 2022-08-18 PROCEDURE — 85025 COMPLETE CBC W/AUTO DIFF WBC: CPT | Performed by: OBSTETRICS & GYNECOLOGY

## 2022-08-18 PROCEDURE — 25000003 PHARM REV CODE 250: Performed by: OBSTETRICS & GYNECOLOGY

## 2022-08-18 RX ADMIN — HYDROXYPROGESTERONE CAPROATE 250 MG: 250 INJECTION INTRAMUSCULAR at 10:08

## 2022-08-18 RX ADMIN — AMOXICILLIN 500 MG: 500 CAPSULE ORAL at 06:08

## 2022-08-18 RX ADMIN — AMOXICILLIN 500 MG: 500 CAPSULE ORAL at 09:08

## 2022-08-18 RX ADMIN — AMOXICILLIN 500 MG: 500 CAPSULE ORAL at 01:08

## 2022-08-18 RX ADMIN — ASPIRIN 81 MG: 81 TABLET, COATED ORAL at 10:08

## 2022-08-18 NOTE — PROGRESS NOTES
Pt seen this am no complaints reports good fetal movement   Denies contractions pain and bleeding  vss afebrile  aao3  abd gravid nt   Ext nt    nst cat one  toco quiet    iup 28-5wks pprom  cw em

## 2022-08-18 NOTE — PROGRESS NOTES
22 year old  at 28 5/7 weeks gestation, currently admitted with  premature rupture of membranes. She is s/p Celstone x 2 on 8-15 and 22 and s/p magnesium sulfate for neuroprotection. She has history of PTD in last pregnancy at 28 weeks. She is on weekly Perla (on ). She is on prophylactic baby aspirin for preeclampsia prophylaxis. Denies cramping, vaginal bleeding, headaches, visual disturbances, epigastric pain, decreased fetal movement.    Review of Systems   Constitutional: Negative for fever.   Eyes: Negative for visual disturbance.   Respiratory: Negative for shortness of breath.    Cardiovascular: Negative for chest pain.   Gastrointestinal: Negative for abdominal pain.   Genitourinary: Negative for vaginal bleeding.   Neurological: Negative for headaches.     [unfilled]    History reviewed. No pertinent past medical history.   Past Surgical History:   Procedure Laterality Date    ADENOIDECTOMY      MYRINGOTOMY W/ TUBES      TONSILLECTOMY        Social History     Socioeconomic History    Marital status: Single   Tobacco Use    Smoking status: Never Smoker    Smokeless tobacco: Never Used   Substance and Sexual Activity    Alcohol use: Not Currently    Drug use: Never    Sexual activity: Yes      Family History   Problem Relation Age of Onset    Diabetes Mother     COPD Mother     Hypertension Son       Review of patient's allergies indicates:  No Known Allergies   Prior to Admission medications    Medication Sig Start Date End Date Taking? Authorizing Provider   HYDROXYprogesterone (PERLA) 250 mg/mL (1 mL) Oil Inject 250 mg into the muscle every 7 days.    Historical Provider   prenatal vit 10-iron fum-folic (VITAFOL-OB) 65-1 mg Tab Take 1 tab by mouth daily 5/3/22   Alan Ricketts MD            Vitals:    22 0225 22 0400 22 0500 22 0557   BP: (!) 91/51   101/69   Pulse: 66 65 70 69   Resp: 18   18   Temp: 98.1 °F (36.7 °C) 98.6 °F (37  "°C)  98.2 °F (36.8 °C)   TempSrc:       SpO2: 97% 97% 96% 99%   Weight:       Height:        No LMP recorded. Patient is pregnant.   HT: 5' 3" (160 cm)  WT: 83.9 kg (185 lb)  BMI: 32.8     Temp:  [97.3 °F (36.3 °C)-99.3 °F (37.4 °C)] 98.2 °F (36.8 °C)  Pulse:  [65-87] 69  Resp:  [18] 18  SpO2:  [96 %-100 %] 99 %  BP: ()/(51-69) 101/69  Physical Exam  Constitutional:       Appearance: Normal appearance.   Cardiovascular:      Rate and Rhythm: Normal rate.   Pulmonary:      Effort: Pulmonary effort is normal.   Abdominal:      Palpations: Abdomen is soft.   Musculoskeletal:         General: Normal range of motion.   Skin:     General: Skin is warm and dry.   Neurological:      Mental Status: She is alert and oriented to person, place, and time.   Psychiatric:         Mood and Affect: Mood normal.       No results found for this or any previous visit (from the past 48 hour(s)).   US OB Non Rad Limited 1 Or More Gestations  See Provider Notes for results.     IMPRESSION: Please see provider notes for interpretation    This procedure was auto-finalized by: Virtual Radiologist  US Non Rad Doppler Umbilical Artery  See Provider Notes for results.     IMPRESSION: Please see provider notes for interpretation    This procedure was auto-finalized by: Virtual Radiologist  US OB Non Rad 14+ Weeks TransAbd, w/Biophysical Profile, w/o NST (xpd)  See Provider Notes for results.     IMPRESSION: Please see provider notes for interpretation    This procedure was auto-finalized by: Virtual Radiologist        Assessment and Plan  [id number] premature rupture of membranes.  Will continue broad spectrum antibiotics to prolong latent phase.  She had some fetal decels yesterday and did BPP/UAD, which was 8/10 with elevated S/D ratio.  With concern for more significant fetal growth restriction than noted on prior US, EFW was repeated this am along with BPP/UAD.  Preliminary BPP 6/8 for ЕЛЕНА of 1.8cm. Dr Ovalle to provide final read on " ultrasounds    Fetal tracing 135 baseline, moderate variability, 15x15 accelerations, occasional mild variable decelerations on prolonged monitoring. Continue continuous monitoring. Delivery not indicated at this time.     -VTE prophylaxis: Continue frequent movement of BLE      I examined the patient around 8:00 a.m..  She had no complaints.  Fetal heart rate is reassuring and better than yesterday.  Ultrasound showed normal fetal growth with improved umbilical artery Doppler.  Continue current care.  There is no uterine tenderness on exam.    Patient was evaluated with NP during rounds in the morning    Components of this note were documented using voice recognition systems; and are subject to errors not corrected at proof reading.  Please contact the author for any clarifications.

## 2022-08-18 NOTE — PROCEDURE NOTE ADDENDUM
Limited obstetrical ultrasound biophysical profile and umbilical artery Doppler    Date of ultrasound is 08/18/2022    Indication is premature rupture of membranes with elevated S/D ratio on umbilical artery Doppler yesterday with concern for fetal growth restriction.    Limited ultrasound was done showed fetus to be in a cephalic presentation longitudinal lie.  Oligohydramnios is noted with ЕЛЕНА of 1.8.  Fetal heart rate was normal at 155 beats per minute.  Doppler study of umbilical artery was done showed normal flow with S/D ratio of 2.5.    BPD was 6.88 cm at 27 weeks 5 days, head circumference 25.2 cm at 27 weeks 3 days, abdominal circumference 23.4 cm at 27 weeks and 5 days (at the 16th percentile) and femur length was 5.2 cm at 27 weeks 5 days.  Estimated fetal weight is 1111 g (2 lb 7 oz) at the 34th percentile.    There was normal fetal movement, tone, breathing, 0 points for fluid.  Her NST was reactive with a baseline around 135 with moderate variability.  Infrequent decelerations are seen on prolonged monitoring.    Impression:  28 weeks and 5 days gestation with low-normal fetal growth and oligohydramnios.    BPP 8/10.    Normal umbilical artery Doppler.

## 2022-08-19 PROCEDURE — 11000001 HC ACUTE MED/SURG PRIVATE ROOM

## 2022-08-19 PROCEDURE — 25000003 PHARM REV CODE 250: Performed by: NURSE PRACTITIONER

## 2022-08-19 PROCEDURE — 72100003 HC LABOR CARE, EA. ADDL. 8 HRS

## 2022-08-19 PROCEDURE — 25000003 PHARM REV CODE 250: Performed by: OBSTETRICS & GYNECOLOGY

## 2022-08-19 PROCEDURE — 63700000 PHARM REV CODE 250 ALT 637 W/O HCPCS: Performed by: OBSTETRICS & GYNECOLOGY

## 2022-08-19 RX ADMIN — AMOXICILLIN 500 MG: 500 CAPSULE ORAL at 09:08

## 2022-08-19 RX ADMIN — AZITHROMYCIN MONOHYDRATE 1000 MG: 250 TABLET ORAL at 10:08

## 2022-08-19 RX ADMIN — AMOXICILLIN 500 MG: 500 CAPSULE ORAL at 02:08

## 2022-08-19 RX ADMIN — ASPIRIN 81 MG: 81 TABLET, COATED ORAL at 10:08

## 2022-08-19 RX ADMIN — AMOXICILLIN 500 MG: 500 CAPSULE ORAL at 05:08

## 2022-08-19 NOTE — PROGRESS NOTES
Ms. Rodriguez is 28 6/7 weeks gestation 28 5/7 weeks gestation, currently admitted with  premature rupture of membranes. She is s/p Celstone x 2 on 8-15 and 22 and s/p magnesium sulfate for neuroprotection. She has history of PTD in last pregnancy at 28 weeks. She is on weekly [place] (on ). She is on prophylactic baby aspirin for preeclampsia prophylaxis. She reports good fetal movement this morning and she denies any cramping, vaginal bleeding, fever, chills.    Review of Systems   Constitutional: Negative.    Eyes: Negative.    Respiratory: Negative.    Cardiovascular: Negative.    Gastrointestinal: Negative.    Endocrine: Negative.    Genitourinary: Negative.    Musculoskeletal: Negative.    Skin: Negative.    Allergic/Immunologic: Negative.    Neurological: Negative.    Hematological: Negative.    Psychiatric/Behavioral: Negative.          History reviewed. No pertinent past medical history.   Past Surgical History:   Procedure Laterality Date    ADENOIDECTOMY      MYRINGOTOMY W/ TUBES      TONSILLECTOMY        Social History     Socioeconomic History    Marital status: Single   Tobacco Use    Smoking status: Never Smoker    Smokeless tobacco: Never Used   Substance and Sexual Activity    Alcohol use: Not Currently    Drug use: Never    Sexual activity: Yes      Family History   Problem Relation Age of Onset    Diabetes Mother     COPD Mother     Hypertension Son       Review of patient's allergies indicates:  No Known Allergies   Prior to Admission medications    Medication Sig Start Date End Date Taking? Authorizing Provider   HYDROXYprogesterone (PERLA) 250 mg/mL (1 mL) Oil Inject 250 mg into the muscle every 7 days.    Historical Provider   prenatal vit 10-iron fum-folic (VITAFOL-OB) 65-1 mg Tab Take 1 tab by mouth daily 5/3/22   Alan Ricketts MD            Vitals:    22 1800 22 1943 22 2152 22 0153   BP:  (!) 101/57 (!) 108/56    Pulse:  88 71   "  Resp:  18 18    Temp: 99 °F (37.2 °C) 98.7 °F (37.1 °C) 98.2 °F (36.8 °C) 98.8 °F (37.1 °C)   TempSrc:       SpO2:   97%    Weight:       Height:        No LMP recorded. Patient is pregnant.   HT: 5' 3" (160 cm)  WT: 83.9 kg (185 lb)  BMI: 32.8     Temp:  [97.3 °F (36.3 °C)-99 °F (37.2 °C)] 98.8 °F (37.1 °C)  Pulse:  [71-88] 71  Resp:  [16-18] 18  SpO2:  [97 %-99 %] 97 %  BP: (101-110)/(56-63) 108/56  Physical Exam  Constitutional:       General: She is not in acute distress.  Pulmonary:      Effort: Pulmonary effort is normal. No respiratory distress.   Abdominal:      Palpations: Abdomen is soft.      Tenderness: There is no abdominal tenderness.      Comments: Gravid uterus, no uterine tenderness   Musculoskeletal:      Right lower leg: No edema.      Left lower leg: No edema.   Skin:     General: Skin is warm and dry.   Neurological:      General: No focal deficit present.      Mental Status: She is alert and oriented to person, place, and time.   Psychiatric:         Mood and Affect: Mood normal.         Behavior: Behavior normal.         Thought Content: Thought content normal.       Recent Results (from the past 48 hour(s))   CBC with Differential    Collection Time: 08/18/22  7:05 AM   Result Value Ref Range    WBC 11.9 (H) 4.5 - 11.5 x10(3)/mcL    RBC 3.46 (L) 4.20 - 5.40 x10(6)/mcL    Hgb 11.0 (L) 12.0 - 16.0 gm/dL    Hct 33.4 (L) 37.0 - 47.0 %    MCV 96.5 (H) 80.0 - 94.0 fL    MCH 31.8 (H) 27.0 - 31.0 pg    MCHC 32.9 (L) 33.0 - 36.0 mg/dL    RDW 13.2 11.5 - 17.0 %    Platelet 213 130 - 400 x10(3)/mcL    MPV 10.2 7.4 - 10.4 fL    Neut % 71.1 %    Lymph % 19.1 %    Mono % 8.3 %    Eos % 0.3 %    Basophil % 0.2 %    Lymph # 2.27 0.6 - 4.6 x10(3)/mcL    Neut # 8.5 2.1 - 9.2 x10(3)/mcL    Mono # 0.99 0.1 - 1.3 x10(3)/mcL    Eos # 0.03 0 - 0.9 x10(3)/mcL    Baso # 0.02 0 - 0.2 x10(3)/mcL    IG# 0.12 (H) 0 - 0.04 x10(3)/mcL    IG% 1.0 %    NRBC% 0.0 %   Strep B Screen, Vaginal / Rectal    Collection Time: " 22  4:05 PM    Specimen: Vaginal/Rectal   Result Value Ref Range    Strep B Culture negative       US OB Non Rad Limited 1 Or More Gestations  See Provider Notes for results.     IMPRESSION: Please see provider notes for interpretation    This procedure was auto-finalized by: Virtual Radiologist  US Non Rad Doppler Umbilical Artery  See Provider Notes for results.     IMPRESSION: Please see provider notes for interpretation    This procedure was auto-finalized by: Virtual Radiologist  US OB Non Rad 14+ Weeks TransAbd, w/Biophysical Profile, w/o NST (xpd)  See Provider Notes for results.     IMPRESSION: Please see provider notes for interpretation    This procedure was auto-finalized by: Virtual Radiologist        Assessment and Plan  28w6d  premature rupture of membranes  FHT currently 135 baseline, moderate variability, 15x15 accels, rare variable decels, no contractions  Continue antibiotics to prolong latent phase.  '  biophysical profile's every other day (next due tomorrow 8-20)  Continuous fetal monitoring  Advised to report any abdominal pain, vaginal bleeding, abnormal discharge, fever, chills    -VTE prophylaxis  SCD's    I examined the patient around 7:45 a.m. with nurse practitioner.  Fetal heart rate was reassuring with a reactive tracing.  There was no uterine tenderness on examination.  Will continue current care and blood BPP tomorrow the    Components of this note were documented using voice recognition systems; and are subject to errors not corrected at proof reading.  Please contact the author for any clarifications.

## 2022-08-19 NOTE — CONSULTS
Critical Care  Services  [facility]    Neonatology Consultation Report        REASON FOR CONSULTATION: 28 6/7 weeks gestation, PPROM    REFERRING PHYSICIAN: BRETT Oh MD    HISTORY:   Patient is 22 year old  at 28 6/7 weeks gestation with a previous 28 weeker that was admitted for premature prolonged rupture of membranes (PPROM) on 8/15/2022. She is currently on antibiotics and will has received two doses of steroids and magnesium. Mother denies any other issue with this current pregnancies.      ASSESSMENT:  This is a 21 yo  mother with PPROM at 28 weeks. I discussed with mother the typical course at 28-29 weeks including respiratory, nutritional, and cardiorespiratory instability. I explained the likely need for mechanical ventilation and umbilical line placement at this gestational age. I encouraged mother to proved expressed breast milk for her  infant. I discussed current survival and developmental statistics for gestation. I stated that her babies outcome will be unpredictable at this time. We will do  screening head ultrasounds and eye exams per unit guidelines. She stated understanding of unpredictable course. I also stated that due to leakage of fluid we will have to monitor baby closely for lung maturity and infection after admission.       RECOMMENDATIONS:  Please continue to keep us updated on mothers hospital course. Please feel free to call for further questions. Thank you for allowing us to participate in the care of this family.       Time: 110 minutes total time of consult including review of maternal chart, face time with mother, discussions with consultants if needed, and preparing of consult note

## 2022-08-19 NOTE — PROCEDURE NOTE ADDENDUM
Fetal nonstress test    Indication is premature rupture of membranes at 28 weeks and 6 days    FHT during morning rounds was around 135 baseline, moderate variability, 15x15 accels, rare variable decels, no contractions      Impression is reactive fetal nonstress test   no

## 2022-08-20 PROCEDURE — 72100003 HC LABOR CARE, EA. ADDL. 8 HRS

## 2022-08-20 PROCEDURE — 11000001 HC ACUTE MED/SURG PRIVATE ROOM

## 2022-08-20 PROCEDURE — 63700000 PHARM REV CODE 250 ALT 637 W/O HCPCS: Performed by: OBSTETRICS & GYNECOLOGY

## 2022-08-20 PROCEDURE — 25000003 PHARM REV CODE 250: Performed by: OBSTETRICS & GYNECOLOGY

## 2022-08-20 PROCEDURE — 25000003 PHARM REV CODE 250: Performed by: NURSE PRACTITIONER

## 2022-08-20 RX ADMIN — AMOXICILLIN 500 MG: 500 CAPSULE ORAL at 03:08

## 2022-08-20 RX ADMIN — AMOXICILLIN 500 MG: 500 CAPSULE ORAL at 10:08

## 2022-08-20 RX ADMIN — AZITHROMYCIN MONOHYDRATE 1000 MG: 250 TABLET ORAL at 09:08

## 2022-08-20 RX ADMIN — ASPIRIN 81 MG: 81 TABLET, COATED ORAL at 09:08

## 2022-08-20 RX ADMIN — AMOXICILLIN 500 MG: 500 CAPSULE ORAL at 06:08

## 2022-08-20 NOTE — PROCEDURE NOTE ADDENDUM
BIOPHYSICAL PROFILE  REPORT    DATE OF ULTRASOUND: 2022     INDICATION: PROM and oligohydramnios    Gestational Age: 29w0d     NST:  2/2  Movement: 2/2  Tone:  2/2  Fluid:  2/2 2.8x2.3 cm pocket  Breathin/2    FHR:  147 bpm  during ultrasound    ЕЛЕНА: 2.8    Presentation: Cephalic        Impression:  Biophysical profile 10/10  With oligohydramnios        Parmjit Ovalle MD       Components of this note were documented using voice recognition systems; and are subject to errors not corrected at proof reading. Please contact author for any clarifications.

## 2022-08-20 NOTE — PROGRESS NOTES
"Nutrition   Progress Note      Recommendations:  1. Continue regular diet as tolerated  2. RD to monitor po intake and weight changes      Reason for Evaluation:  Length of Stay    Diagnosis:    1.  premature rupture of membranes (PPROM) with unknown onset of labor        Relevant Medical History:  History reviewed. No pertinent past medical history.      Nutrition Diet History:    Factors affecting nutritional intake: none identified at this time    Food / [employer] / Culture Preferences:  n/a      Nutrition Prescription Ordered:    Current Diet Order: regular    Appetite:  Good (> 75% - 100% po intake)    PO intake: 75 - 100 %      Labs / Medications / Procedures:    Nutrition Related Medications: NaCl PRN    Nutrition Related Labs:  No new labs      Anthropometrics:  Height: 5' 3" (1.6 m)  Admit Weight:  Weight: 83.9 kg (185 lb)  Latest Weight:  83.9 kg (185 lb)    Wt Readings from Last 5 Encounters:   22 83.9 kg (185 lb)   22 83.5 kg (184 lb)   22 83.5 kg (184 lb)   22 80.7 kg (178 lb)   22 81.6 kg (180 lb)     IBW: 52.3 kg  %IBW: 160.4%  UBW: 83.9 kg  %Weight Change: 0%  Body mass index is 32.77 kg/m².  BMI classification:  Obese Grade I (BMI 30 - 34.9)      Nutrition Narrative:  : pt reports good appetite, with no n/v/d/c. Pt reported last known weight of 83.9 kg (185 lb) with no recent weight loss.     Monitoring and Evaluation:    Nutrition Monitoring and Evaluation:  food and beverage intake and weight change    Nutrition Risk:  Level of Nutrition Risk:  Low  Frequency of Follow up:  Dietitian will f/up within 7 days.      Rea Metcalf, Registration Eligible, Provisional LDN        "

## 2022-08-20 NOTE — PROGRESS NOTES
HPI:Ms. Rodriguez is 29 0/7weeks gestation 28 5/7 weeks gestation, currently admitted with  premature rupture of membranes. She is s/p Celstone x 2 on 8-15 and 22 and s/p magnesium sulfate for neuroprotection .  She is finishing oral antibiotics course.. She has history of PTD in last pregnancy at 28 weeks. She is on weekly Perla (on ). She is on prophylactic baby aspirin for preeclampsia prophylaxis. She reports good fetal movement this morning and she denies any cramping, vaginal bleeding, fever, chills.     Review of Systems   Constitutional: Negative.    Eyes: Negative.    Respiratory: Negative.    Cardiovascular: Negative.    Gastrointestinal: Negative.    Endocrine: Negative.    Genitourinary: Negative.    Musculoskeletal: Negative.    Skin: Negative.    Allergic/Immunologic: Negative.    Neurological: Negative.    Hematological: Negative.    Psychiatric/Behavioral: Negative.    Review of Systems     chart review    History reviewed. No pertinent past medical history.   Past Surgical History:   Procedure Laterality Date    ADENOIDECTOMY      MYRINGOTOMY W/ TUBES      TONSILLECTOMY        Social History     Socioeconomic History    Marital status: Single   Tobacco Use    Smoking status: Never Smoker    Smokeless tobacco: Never Used   Substance and Sexual Activity    Alcohol use: Not Currently    Drug use: Never    Sexual activity: Yes      Family History   Problem Relation Age of Onset    Diabetes Mother     COPD Mother     Hypertension Son       Review of patient's allergies indicates:  No Known Allergies   Prior to Admission medications    Medication Sig Start Date End Date Taking? Authorizing Provider   HYDROXYprogesterone (PERLA) 250 mg/mL (1 mL) Oil Inject 250 mg into the muscle every 7 days.    Historical Provider   prenatal vit 10-iron fum-folic (VITAFOL-OB) 65-1 mg Tab Take 1 tab by mouth daily 5/3/22   Alan Ricketts MD            Vitals:    22 0571  "08/20/22 1257 08/20/22 1258 08/20/22 1302   BP:       Pulse: 95 97 96 96   Resp:       Temp:       TempSrc:       SpO2:  96%  (!) 93%   Weight:       Height:        No LMP recorded. Patient is pregnant.   HT: 5' 3" (160 cm)  WT: 83.9 kg (185 lb)  BMI: 32.8     Temp:  [98.3 °F (36.8 °C)-98.6 °F (37 °C)] 98.6 °F (37 °C)  Pulse:  [66-97] 96  Resp:  [17-20] 17  SpO2:  [93 %-100 %] 93 %  BP: ()/(54-72) 115/68  Physical Exam     Patient appears alert oriented in no acute distress.      Her abdomen is soft and not tender.  No uterine tenderness is noted.  NST reactive with a biophysical profile done today 10/10.  With persistent oligohydramnios with an ЕЛЕНА of 2.8  Recent Results (from the past 48 hour(s))   Strep B Screen, Vaginal / Rectal    Collection Time: 08/18/22  4:05 PM    Specimen: Vaginal/Rectal   Result Value Ref Range    Strep B Culture negative       US OB Non Rad 14+ Weeks TransAbd, w/Biophysical Profile, w/o NST (xpd)  See Provider Notes for results.     IMPRESSION: Please see provider notes for interpretation    This procedure was auto-finalized by: Virtual Radiologist                Assessement and Plan  29w0d with premature rupture membranes with no signs or symptoms of infection.  Will continue current care.      -VTE prophylaxis: continue same care    Components of this note were documented using voice recognition systems; and are subject to errors not corrected at proof reading.  Please contact the author for any clarifications.           "

## 2022-08-21 LAB
BASOPHILS # BLD AUTO: 0.03 X10(3)/MCL (ref 0–0.2)
BASOPHILS NFR BLD AUTO: 0.2 %
EOSINOPHIL # BLD AUTO: 0.44 X10(3)/MCL (ref 0–0.9)
EOSINOPHIL NFR BLD AUTO: 3 %
ERYTHROCYTE [DISTWIDTH] IN BLOOD BY AUTOMATED COUNT: 13 % (ref 11.5–17)
HCT VFR BLD AUTO: 37.9 % (ref 37–47)
HGB BLD-MCNC: 12.6 GM/DL (ref 12–16)
IMM GRANULOCYTES # BLD AUTO: 0.24 X10(3)/MCL (ref 0–0.04)
IMM GRANULOCYTES NFR BLD AUTO: 1.6 %
LYMPHOCYTES # BLD AUTO: 3.22 X10(3)/MCL (ref 0.6–4.6)
LYMPHOCYTES NFR BLD AUTO: 21.6 %
MCH RBC QN AUTO: 31.5 PG (ref 27–31)
MCHC RBC AUTO-ENTMCNC: 33.2 MG/DL (ref 33–36)
MCV RBC AUTO: 94.8 FL (ref 80–94)
MONOCYTES # BLD AUTO: 1.01 X10(3)/MCL (ref 0.1–1.3)
MONOCYTES NFR BLD AUTO: 6.8 %
NEUTROPHILS # BLD AUTO: 10 X10(3)/MCL (ref 2.1–9.2)
NEUTROPHILS NFR BLD AUTO: 66.8 %
NRBC BLD AUTO-RTO: 0 %
PLATELET # BLD AUTO: 271 X10(3)/MCL (ref 130–400)
PMV BLD AUTO: 9.8 FL (ref 7.4–10.4)
RBC # BLD AUTO: 4 X10(6)/MCL (ref 4.2–5.4)
WBC # SPEC AUTO: 14.9 X10(3)/MCL (ref 4.5–11.5)

## 2022-08-21 PROCEDURE — 63700000 PHARM REV CODE 250 ALT 637 W/O HCPCS: Performed by: OBSTETRICS & GYNECOLOGY

## 2022-08-21 PROCEDURE — 36415 COLL VENOUS BLD VENIPUNCTURE: CPT | Performed by: OBSTETRICS & GYNECOLOGY

## 2022-08-21 PROCEDURE — 25000003 PHARM REV CODE 250: Performed by: NURSE PRACTITIONER

## 2022-08-21 PROCEDURE — 25000003 PHARM REV CODE 250: Performed by: OBSTETRICS & GYNECOLOGY

## 2022-08-21 PROCEDURE — 85025 COMPLETE CBC W/AUTO DIFF WBC: CPT | Performed by: OBSTETRICS & GYNECOLOGY

## 2022-08-21 PROCEDURE — 72100003 HC LABOR CARE, EA. ADDL. 8 HRS

## 2022-08-21 PROCEDURE — 11000001 HC ACUTE MED/SURG PRIVATE ROOM

## 2022-08-21 RX ADMIN — AMOXICILLIN 500 MG: 500 CAPSULE ORAL at 06:08

## 2022-08-21 RX ADMIN — AZITHROMYCIN MONOHYDRATE 1000 MG: 250 TABLET ORAL at 09:08

## 2022-08-21 RX ADMIN — ASPIRIN 81 MG: 81 TABLET, COATED ORAL at 09:08

## 2022-08-21 RX ADMIN — AMOXICILLIN 500 MG: 500 CAPSULE ORAL at 02:08

## 2022-08-21 RX ADMIN — AMOXICILLIN 500 MG: 500 CAPSULE ORAL at 10:08

## 2022-08-22 LAB — OTHER ANTIBIOTIC ISLT MIC: ABNORMAL

## 2022-08-22 PROCEDURE — 11000001 HC ACUTE MED/SURG PRIVATE ROOM

## 2022-08-22 PROCEDURE — 25000003 PHARM REV CODE 250: Performed by: NURSE PRACTITIONER

## 2022-08-22 PROCEDURE — 72100003 HC LABOR CARE, EA. ADDL. 8 HRS

## 2022-08-22 RX ADMIN — ASPIRIN 81 MG: 81 TABLET, COATED ORAL at 08:08

## 2022-08-22 NOTE — PROCEDURE NOTE ADDENDUM
BIOPHYSICAL PROFILE  REPORT    DATE OF ULTRASOUND: 2022     INDICATION: PROM and oligohydramnios    Gestational Age: 29w2d     NST:  2/2 baseline was around 140 with moderate variability and accelerations about 10 x 10 with intermittent variable decelerations  Movement: 2/2  Tone:  2/2  Fluid:  0/2  Breathin/2    FHR:  169 bpm  during ultrasound    ЕЛЕНА:  2.5    Presentation: Cephalic        Impression:  Biophysical profile 8/10 with oligohydramnios        Parmjit Ovalle MD       Components of this note were documented using voice recognition systems; and are subject to errors not corrected at proof reading. Please contact author for any clarifications.

## 2022-08-22 NOTE — PROGRESS NOTES
ROUNDED WITH DR TAFOYA THIS AM    29 0/7weeks gestation 29 2/7 weeks gestation, currently admitted with  premature rupture of membranes. She is s/p Celstone x 2 on 8-15 and 22 and s/p magnesium sulfate for neuroprotection. She has history of PTD in last pregnancy at 28 weeks. She is on weekly Perkins (on ). She is on prophylactic baby aspirin for preeclampsia prophylaxis. She reports good fetal movement this morning and she denies any cramping, vaginal bleeding, fever, chills.    Review of Systems   Constitutional: Negative for fever.   Eyes: Negative for visual disturbance.   Respiratory: Negative for shortness of breath.    Cardiovascular: Negative for chest pain.   Gastrointestinal: Negative for abdominal pain.   Genitourinary: Negative for vaginal bleeding.   Neurological: Negative for headaches.     [unfilled]    History reviewed. No pertinent past medical history.   Past Surgical History:   Procedure Laterality Date    ADENOIDECTOMY      MYRINGOTOMY W/ TUBES      TONSILLECTOMY        Social History     Socioeconomic History    Marital status: Single   Tobacco Use    Smoking status: Never Smoker    Smokeless tobacco: Never Used   Substance and Sexual Activity    Alcohol use: Not Currently    Drug use: Never    Sexual activity: Yes      Family History   Problem Relation Age of Onset    Diabetes Mother     COPD Mother     Hypertension Son       Review of patient's allergies indicates:  No Known Allergies   Prior to Admission medications    Medication Sig Start Date End Date Taking? Authorizing Provider   HYDROXYprogesterone (PERLA) 250 mg/mL (1 mL) Oil Inject 250 mg into the muscle every 7 days.    Historical Provider   prenatal vit 10-iron fum-folic (VITAFOL-OB) 65-1 mg Tab Take 1 tab by mouth daily 5/3/22   Alan Ricketts MD            Vitals:    22 0805 22 0807 22 0809 22 0812   BP:       Pulse: 71 77 78 82   Resp:       Temp:       TempSrc:      "  SpO2:  96%  97%   Weight:       Height:        No LMP recorded. Patient is pregnant.   HT: 5' 3" (160 cm)  WT: 83.9 kg (185 lb)  BMI: 32.8     Temp:  [98 °F (36.7 °C)-99.7 °F (37.6 °C)] 98 °F (36.7 °C)  Pulse:  [] 82  Resp:  [17-18] 18  SpO2:  [18 %-100 %] 97 %  BP: (101-117)/(55-72) 101/61  Physical Exam  Constitutional:       Appearance: Normal appearance.   Cardiovascular:      Rate and Rhythm: Normal rate.   Pulmonary:      Effort: Pulmonary effort is normal.   Abdominal:      Palpations: Abdomen is soft.   Musculoskeletal:         General: Normal range of motion.   Skin:     General: Skin is warm and dry.   Neurological:      Mental Status: She is alert and oriented to person, place, and time.   Psychiatric:         Mood and Affect: Mood normal.       Recent Results (from the past 48 hour(s))   CBC with Differential    Collection Time: 22  8:27 AM   Result Value Ref Range    WBC 14.9 (H) 4.5 - 11.5 x10(3)/mcL    RBC 4.00 (L) 4.20 - 5.40 x10(6)/mcL    Hgb 12.6 12.0 - 16.0 gm/dL    Hct 37.9 37.0 - 47.0 %    MCV 94.8 (H) 80.0 - 94.0 fL    MCH 31.5 (H) 27.0 - 31.0 pg    MCHC 33.2 33.0 - 36.0 mg/dL    RDW 13.0 11.5 - 17.0 %    Platelet 271 130 - 400 x10(3)/mcL    MPV 9.8 7.4 - 10.4 fL    Neut % 66.8 %    Lymph % 21.6 %    Mono % 6.8 %    Eos % 3.0 %    Basophil % 0.2 %    Lymph # 3.22 0.6 - 4.6 x10(3)/mcL    Neut # 10.0 (H) 2.1 - 9.2 x10(3)/mcL    Mono # 1.01 0.1 - 1.3 x10(3)/mcL    Eos # 0.44 0 - 0.9 x10(3)/mcL    Baso # 0.03 0 - 0.2 x10(3)/mcL    IG# 0.24 (H) 0 - 0.04 x10(3)/mcL    IG% 1.6 %    NRBC% 0.0 %      US OB Non Rad 14+ Weeks TransAbd, w/Biophysical Profile, w/o NST (xpd)  See Provider Notes for results.     IMPRESSION: Please see provider notes for interpretation    This procedure was auto-finalized by: Virtual Radiologist        Assessment and Plan  29w2d  premature rupture of membranes. Abdomen soft/nontender. Completed broad spectrum antibiotic therapy. BPP done this am, pending " read. Fetal tracing 140s baseline, moderate variability, accelerations, occasional mild variable decelerations. Delivery not indicated at this time. Continue current monitoring. Continue expectant management at this time.    -previous PTD. Continue weekly Audra.     -VTE prophylaxis: Continue same management with frequent movement of BLE  I just examined the patient with NP.  Patient had no complaints.  Continues to leak slight fluid.  Is no uterine tenderness on examination.  We stopped Zithromax as she has done multiple doses.  Components of this note were documented using voice recognition systems; and are subject to errors not corrected at proof reading.  Please contact the author for any clarifications.

## 2022-08-22 NOTE — PROGRESS NOTES
[facility]                       [address] [id number]    PATIENT NAME:       ALEXANDER RODRIGUEZ   YOB: 2000  CSN:                [id number]   MRN:                [id number]  ADMIT DATE:         08/15/2022 09:18:00  PHYSICIAN:          Alan Ricketts MD                            PROGRESS NOTE    DATE:      Ms. Rodriguez was seen at the bedside this morning.  She has no complaints.    She denies headaches, visual changes, fevers, chills.  She reports good fetal   movement.  Reports continued leakage of fluid.  Denies vaginal bleeding and   contractions.    PHYSICAL EXAMINATION:  VITAL SIGNS:  Her vital signs are stable.  She is afebrile.  She is alert,   oriented x3.  ABDOMEN:  Her abdomen is gravid, nontender, nondistended.    EXTREMITIES:  Nontender.      Her biophysical profile of this morning is pending.  The ultrasound technician   was just walking out the room.  She said it looked good overall.  Baby had   breathing.    ASSESSMENT AND PLAN:  This is a 22-year-old  4, para 0-1-2-1, at 29 weeks   and 2 days with PPROM stable. The plan, continue with current management.    Continue with monitoring.  Appreciate Dr. Parmjit Ovalle' help.  I explained to Ms. Rodriguez that if everything looks stable today, she inquired about going   outside to get some fresh air for 10-15 minutes, and I told her that would not   be unreasonable.  We will see with a biophysical profile shows when Dr. Ovalle   reads it.        ______________________________  Alan Ricketts MD    EPE/AQS  DD:  2022  Time:  07:41AM  DT:  2022  Time:  07:49AM  Job #:  [id number]      PROGRESS NOTE

## 2022-08-23 PROCEDURE — 72100003 HC LABOR CARE, EA. ADDL. 8 HRS

## 2022-08-23 PROCEDURE — 25000003 PHARM REV CODE 250: Performed by: NURSE PRACTITIONER

## 2022-08-23 PROCEDURE — 11000001 HC ACUTE MED/SURG PRIVATE ROOM

## 2022-08-23 RX ADMIN — ASPIRIN 81 MG: 81 TABLET, COATED ORAL at 08:08

## 2022-08-23 NOTE — PROGRESS NOTES
Monitors reapplied to soft nontender abdomen following pt wheelchair ride and shower.  Pt reports active movement

## 2022-08-23 NOTE — PROGRESS NOTES
Pt seen this am no complaints reports good fetal movement reports lof denies pain denies bleeding  vss afebrile  aao3  abd nt nd gravid  Ext nt    iup 29-3 pprom cw current care plan

## 2022-08-23 NOTE — PROCEDURE NOTE ADDENDUM
Fetal nonstress test    Indication is premature rupture of membranes.    Around 8:00 a.m.FHT this morning reassuring with 130's baseline, moderate variability, accels present, infrequent variable decels, no contractions    Impression:  Reactive fetal nonstress test

## 2022-08-23 NOTE — PROGRESS NOTES
Ms. Rodriguez is 29 3/7weeks gestation, currently admitted with  premature rupture of membranes. She is s/p Celstone x 2 on 8-15 and 22 and s/p magnesium sulfate for neuroprotection. She has also finished the course of antibiotics. She has history of PTD in last pregnancy at 28 weeks. She is on weekly Perla (on ). She is on prophylactic baby aspirin for preeclampsia prophylaxis. She has no complaints and she reports good fetal movement this morning. She denies any cramping, vaginal bleeding, fever, chills.    Review of Systems   Constitutional: Negative.    Eyes: Negative.    Respiratory: Negative.    Cardiovascular: Negative.    Gastrointestinal: Negative.    Endocrine: Negative.    Genitourinary: Negative.    Musculoskeletal: Negative.    Skin: Negative.    Allergic/Immunologic: Negative.    Neurological: Negative.    Hematological: Negative.    Psychiatric/Behavioral: Negative.        History reviewed. No pertinent past medical history.   Past Surgical History:   Procedure Laterality Date    ADENOIDECTOMY      MYRINGOTOMY W/ TUBES      TONSILLECTOMY        Social History     Socioeconomic History    Marital status: Single   Tobacco Use    Smoking status: Never Smoker    Smokeless tobacco: Never Used   Substance and Sexual Activity    Alcohol use: Not Currently    Drug use: Never    Sexual activity: Yes      Family History   Problem Relation Age of Onset    Diabetes Mother     COPD Mother     Hypertension Son       Review of patient's allergies indicates:  No Known Allergies   Prior to Admission medications    Medication Sig Start Date End Date Taking? Authorizing Provider   HYDROXYprogesterone (PERLA) 250 mg/mL (1 mL) Oil Inject 250 mg into the muscle every 7 days.    Historical Provider   prenatal vit 10-iron fum-folic (VITAFOL-OB) 65-1 mg Tab Take 1 tab by mouth daily 5/3/22   Alan Ricketts MD            Vitals:    22 0745 22 0749 22 0751 22 0753  "  BP:  (!) 94/53 107/64    Pulse: 74 76 83 88   Resp:   16    Temp:   98 °F (36.7 °C)    TempSrc:   Oral    SpO2: 96%  100%    Weight:       Height:        No LMP recorded. Patient is pregnant.   HT: 5' 3" (160 cm)  WT: 83.9 kg (185 lb)  BMI: 32.8     Temp:  [98 °F (36.7 °C)-98.9 °F (37.2 °C)] 98 °F (36.7 °C)  Pulse:  [] 88  Resp:  [16-18] 16  SpO2:  [87 %-100 %] 100 %  BP: ()/(53-71) 107/64  Physical Exam  Constitutional:       General: She is not in acute distress.  Pulmonary:      Effort: Pulmonary effort is normal. No respiratory distress.   Abdominal:      Palpations: Abdomen is soft.      Tenderness: There is no abdominal tenderness.      Comments: Gravid uterus, no uterine tenderness   Skin:     General: Skin is warm and dry.   Neurological:      General: No focal deficit present.      Mental Status: She is alert and oriented to person, place, and time.   Psychiatric:         Mood and Affect: Mood normal.         Behavior: Behavior normal.         Thought Content: Thought content normal.       No results found for this or any previous visit (from the past 48 hour(s)).   US OB Non Rad 14+ Weeks TransAbd, w/Biophysical Profile, w/o NST (xpd)  See Provider Notes for results.     IMPRESSION: Please see provider notes for interpretation    This procedure was auto-finalized by: Virtual Radiologist      Assessment and Plan  29w3d    premature rupture of membranes.    FHT this morning reassuring with 130's baseline, moderate variability, accels present, infrequent variable decels, no contractions  The uterus is soft and nontender. Vitals are stable  Biophysical profiles every other day, next due on tomorrow 8-24  Advised to report any abdominal pain, cramping, vaginal bleeding, abnormal discharge, fever, chills     -VTE prophylaxis  Continue current management    TAMRA Brar as scribe only for Dr. Ovalle    I examined the patient this morning around 8:00 a.m..  She had no complaints.  " Slight leaking fluid.  No uterine tenderness.  Finished course of antibiotics.  Fetal heart rate was reassuring.  Frequent movement of extremities for DVT prophylaxis with patient not having SCDs on during rounds     Components of this note were documented using voice recognition systems; and are subject to errors not corrected at proof reading.  Please contact the author for any clarifications.

## 2022-08-24 LAB
BASOPHILS # BLD AUTO: 0.04 X10(3)/MCL (ref 0–0.2)
BASOPHILS NFR BLD AUTO: 0.3 %
EOSINOPHIL # BLD AUTO: 0.25 X10(3)/MCL (ref 0–0.9)
EOSINOPHIL NFR BLD AUTO: 1.9 %
ERYTHROCYTE [DISTWIDTH] IN BLOOD BY AUTOMATED COUNT: 13 % (ref 11.5–17)
HCT VFR BLD AUTO: 36.5 % (ref 37–47)
HGB BLD-MCNC: 12.2 GM/DL (ref 12–16)
IMM GRANULOCYTES # BLD AUTO: 0.2 X10(3)/MCL (ref 0–0.04)
IMM GRANULOCYTES NFR BLD AUTO: 1.5 %
LYMPHOCYTES # BLD AUTO: 2.75 X10(3)/MCL (ref 0.6–4.6)
LYMPHOCYTES NFR BLD AUTO: 21.2 %
MCH RBC QN AUTO: 32.1 PG (ref 27–31)
MCHC RBC AUTO-ENTMCNC: 33.4 MG/DL (ref 33–36)
MCV RBC AUTO: 96.1 FL (ref 80–94)
MONOCYTES # BLD AUTO: 1.03 X10(3)/MCL (ref 0.1–1.3)
MONOCYTES NFR BLD AUTO: 7.9 %
NEUTROPHILS # BLD AUTO: 8.7 X10(3)/MCL (ref 2.1–9.2)
NEUTROPHILS NFR BLD AUTO: 67.2 %
NRBC BLD AUTO-RTO: 0 %
PLATELET # BLD AUTO: 259 X10(3)/MCL (ref 130–400)
PMV BLD AUTO: 9.9 FL (ref 7.4–10.4)
RBC # BLD AUTO: 3.8 X10(6)/MCL (ref 4.2–5.4)
WBC # SPEC AUTO: 13 X10(3)/MCL (ref 4.5–11.5)

## 2022-08-24 PROCEDURE — 85025 COMPLETE CBC W/AUTO DIFF WBC: CPT | Performed by: OBSTETRICS & GYNECOLOGY

## 2022-08-24 PROCEDURE — 72100003 HC LABOR CARE, EA. ADDL. 8 HRS

## 2022-08-24 PROCEDURE — 25000003 PHARM REV CODE 250: Performed by: NURSE PRACTITIONER

## 2022-08-24 PROCEDURE — 11000001 HC ACUTE MED/SURG PRIVATE ROOM

## 2022-08-24 PROCEDURE — 36415 COLL VENOUS BLD VENIPUNCTURE: CPT | Performed by: OBSTETRICS & GYNECOLOGY

## 2022-08-24 RX ADMIN — ASPIRIN 81 MG: 81 TABLET, COATED ORAL at 09:08

## 2022-08-24 NOTE — PROCEDURE NOTE ADDENDUM
BIOPHYSICAL PROFILE  REPORT    DATE OF ULTRASOUND: 2022     INDICATION: PROM and oligohydramnios    Gestational Age: 29w4d     NST:  2/2 baseline 135 during rounds and acceleration  Movement: 2/2  Tone:  2/2  Fluid:  2/2 2.2 cm seen  Breathin/2    FHR:  139 bpm  during ultrasound    ЕЛЕНА: 4.1    Presentation: Cephalic        Impression:  Biophysical profile 10/10         Parmjit Ovalle MD       Components of this note were documented using voice recognition systems; and are subject to errors not corrected at proof reading. Please contact author for any clarifications.

## 2022-08-24 NOTE — PROGRESS NOTES
[facility]                       [address] [id number]    PATIENT NAME:       ALEXANDER RODRIGUEZ   YOB: 2000  CSN:                [id number]   MRN:                [id number]  ADMIT DATE:         08/15/2022 09:18:00  PHYSICIAN:          Alan Rikcetts MD                            PROGRESS NOTE    DATE:      Ms. Rodriguez was seen at the bedside this morning resting comfortably.  She   denies headaches, chills, fevers.  She reports good fetal movement.  Says she is   still having continued leakage of fluid.  Denies vaginal bleeding.  She reports   good fetal movement.    PHYSICAL EXAMINATION:  VITAL SIGNS: Stable. She is afebrile.  GENERAL: She is alert, oriented x3.  ABDOMEN: Her abdomen is gravid, nontender.  PELVIC: Deferred.    EXTREMITIES: Nontender.     NST category 1, baseline of 140s.  TOCO quiet.    ASSESSMENT:  A 22-year-old  4, para 0-1-2-1, 29 weeks 4 days with PPROM.    PLAN:  Continue with expectant management.  Monitor for signs and symptoms of   chorio.        ______________________________  Alan Ricketts MD    EPE/AQS  DD:  2022  Time:  08:04AM  DT:  2022  Time:  08:14AM  Job #:  [id number]      PROGRESS NOTE

## 2022-08-24 NOTE — PROGRESS NOTES
ROUNDED WITH DR TAFOYA THIS AM    29 0/7weeks gestation 29 4/7 weeks gestation, currently admitted with  premature rupture of membranes. She is s/p Celstone x 2 on 8-15 and 22 and s/p magnesium sulfate for neuroprotection. Also s/p broad spectrum antibiotic therapy.. She has history of PTD in last pregnancy at 28 weeks. She is on weekly [place] (on ). She is on prophylactic baby aspirin for preeclampsia prophylaxis.Reports some leakage of clear fluid. She reports good fetal movement this morning and she denies any cramping, vaginal bleeding, fever, chills.      Review of Systems   Constitutional: Negative for fever.   Eyes: Negative for visual disturbance.   Respiratory: Negative for shortness of breath.    Cardiovascular: Negative for chest pain.   Gastrointestinal: Negative for abdominal pain.   Genitourinary: Negative for vaginal bleeding.   Neurological: Negative for headaches.     [unfilled]    History reviewed. No pertinent past medical history.   Past Surgical History:   Procedure Laterality Date    ADENOIDECTOMY      MYRINGOTOMY W/ TUBES      TONSILLECTOMY        Social History     Socioeconomic History    Marital status: Single   Tobacco Use    Smoking status: Never Smoker    Smokeless tobacco: Never Used   Substance and Sexual Activity    Alcohol use: Not Currently    Drug use: Never    Sexual activity: Yes      Family History   Problem Relation Age of Onset    Diabetes Mother     COPD Mother     Hypertension Son       Review of patient's allergies indicates:  No Known Allergies   Prior to Admission medications    Medication Sig Start Date End Date Taking? Authorizing Provider   HYDROXYprogesterone (PERLA) 250 mg/mL (1 mL) Oil Inject 250 mg into the muscle every 7 days.    Historical Provider   prenatal vit 10-iron fum-folic (VITAFOL-OB) 65-1 mg Tab Take 1 tab by mouth daily 5/3/22   Alan Ricketts MD            Vitals:    22 1910 22 2202 22 0787  "08/24/22 0702   BP: 112/69 115/67 (!) 102/59 (!) 108/52   Pulse: 83 97 78 74   Resp: 18 18 18 17   Temp: 98.2 °F (36.8 °C) 98.2 °F (36.8 °C) 98 °F (36.7 °C) 98.2 °F (36.8 °C)   TempSrc: Oral Oral Oral Oral   SpO2: 98%      Weight:       Height:        No LMP recorded. Patient is pregnant.   HT: 5' 3" (160 cm)  WT: 83.9 kg (185 lb)  BMI: 32.8     Temp:  [98 °F (36.7 °C)-99.1 °F (37.3 °C)] 98.2 °F (36.8 °C)  Pulse:  [69-98] 74  Resp:  [16-18] 17  SpO2:  [84 %-100 %] 98 %  BP: ()/(52-70) 108/52  Physical Exam  Constitutional:       Appearance: Normal appearance.   Cardiovascular:      Rate and Rhythm: Normal rate.   Pulmonary:      Effort: Pulmonary effort is normal.   Abdominal:      Palpations: Abdomen is soft.   Musculoskeletal:         General: Normal range of motion.   Skin:     General: Skin is warm and dry.   Neurological:      Mental Status: She is alert and oriented to person, place, and time.   Psychiatric:         Mood and Affect: Mood normal.       Recent Results (from the past 48 hour(s))   CBC with Differential    Collection Time: 08/24/22  6:00 AM   Result Value Ref Range    WBC 13.0 (H) 4.5 - 11.5 x10(3)/mcL    RBC 3.80 (L) 4.20 - 5.40 x10(6)/mcL    Hgb 12.2 12.0 - 16.0 gm/dL    Hct 36.5 (L) 37.0 - 47.0 %    MCV 96.1 (H) 80.0 - 94.0 fL    MCH 32.1 (H) 27.0 - 31.0 pg    MCHC 33.4 33.0 - 36.0 mg/dL    RDW 13.0 11.5 - 17.0 %    Platelet 259 130 - 400 x10(3)/mcL    MPV 9.9 7.4 - 10.4 fL    Neut % 67.2 %    Lymph % 21.2 %    Mono % 7.9 %    Eos % 1.9 %    Basophil % 0.3 %    Lymph # 2.75 0.6 - 4.6 x10(3)/mcL    Neut # 8.7 2.1 - 9.2 x10(3)/mcL    Mono # 1.03 0.1 - 1.3 x10(3)/mcL    Eos # 0.25 0 - 0.9 x10(3)/mcL    Baso # 0.04 0 - 0.2 x10(3)/mcL    IG# 0.20 (H) 0 - 0.04 x10(3)/mcL    IG% 1.5 %    NRBC% 0.0 %      US OB Non Rad 14+ Weeks TransAbd, w/Biophysical Profile, w/o NST (xpd)  See Provider Notes for results.     IMPRESSION: Please see provider notes for interpretation    This procedure was " auto-finalized by: Virtual Radiologist        Assessment and Plan  [id number] premature rupture of membranes. Abdomen soft/nontender. BPP done this am, preliminary read shows ЕЛЕНА 4.1 and BPP 8/8. Fetal tracing 135 baseline, moderate variability, accelerations, occasional variable decelerations o prolonged monitoring. Delivery not indicated at this time. Continue current monitoring. Continue expectant management at this time.     -previous PTD. Continue weekly [place].      I examined the patient with nurse practitioner during rounds today.  There was no uterine tenderness.  Biophysical profile is 10/10.  Continue current care.  Components of this note were documented using voice recognition systems; and are subject to errors not corrected at proof reading.  Please contact the author for any clarifications.

## 2022-08-25 PROCEDURE — 11000001 HC ACUTE MED/SURG PRIVATE ROOM

## 2022-08-25 PROCEDURE — 63600175 PHARM REV CODE 636 W HCPCS: Mod: JG | Performed by: OBSTETRICS & GYNECOLOGY

## 2022-08-25 PROCEDURE — 25000003 PHARM REV CODE 250: Performed by: NURSE PRACTITIONER

## 2022-08-25 PROCEDURE — 72100003 HC LABOR CARE, EA. ADDL. 8 HRS

## 2022-08-25 RX ADMIN — HYDROXYPROGESTERONE CAPROATE 250 MG: 250 INJECTION INTRAMUSCULAR at 09:08

## 2022-08-25 RX ADMIN — ASPIRIN 81 MG: 81 TABLET, COATED ORAL at 09:08

## 2022-08-25 NOTE — PROGRESS NOTES
Pt seen this am no complaints.   Pain controlled  vss afebrile  aao3  abd nt nd gravid  Ext nt    nst cat one  toco quiet    iup 29-5 pprom stable cw em

## 2022-08-25 NOTE — PROGRESS NOTES
Ms. Rodriguez is 29 5/7 weeks gestation admitted with  premature rupture of membranes. She is s/p Celstone x 2 on 8-15 and 22 as well as course of magnesium sulfate for neuroprotection. She has also completed the course of broad spectrum antibiotic therapy. She has history of PTD in last pregnancy at 28 weeks for which she is on weekly [place] (on ). She is on prophylactic baby aspirin for preeclampsia prophylaxis. She has no complaints this morning. She reports good fetal movement, and she denies any abdominal pain or cramping, vaginal bleeding, fever, chills.    Review of Systems   Constitutional: Negative.    Eyes: Negative.    Respiratory: Negative.    Cardiovascular: Negative.    Gastrointestinal: Negative.    Endocrine: Negative.    Genitourinary: Negative.    Musculoskeletal: Negative.    Skin: Negative.    Allergic/Immunologic: Negative.    Neurological: Negative.    Hematological: Negative.    Psychiatric/Behavioral: Negative.          History reviewed. No pertinent past medical history.   Past Surgical History:   Procedure Laterality Date    ADENOIDECTOMY      MYRINGOTOMY W/ TUBES      TONSILLECTOMY        Social History     Socioeconomic History    Marital status: Single   Tobacco Use    Smoking status: Never Smoker    Smokeless tobacco: Never Used   Substance and Sexual Activity    Alcohol use: Not Currently    Drug use: Never    Sexual activity: Yes      Family History   Problem Relation Age of Onset    Diabetes Mother     COPD Mother     Hypertension Son       Review of patient's allergies indicates:  No Known Allergies   Prior to Admission medications    Medication Sig Start Date End Date Taking? Authorizing Provider   HYDROXYprogesterone (PERLA) 250 mg/mL (1 mL) Oil Inject 250 mg into the muscle every 7 days.    Historical Provider   prenatal vit 10-iron fum-folic (VITAFOL-OB) 65-1 mg Tab Take 1 tab by mouth daily 5/3/22   Alan Ricketts MD            Vitals:     "08/24/22 1911 08/24/22 2318 08/25/22 0558 08/25/22 0801   BP: 121/61 117/60 101/60 110/60   Pulse: 80 75 78 75   Resp: 18 18 18 17   Temp: 98.2 °F (36.8 °C) 98 °F (36.7 °C) 98.6 °F (37 °C) 98.1 °F (36.7 °C)   TempSrc: Oral Oral     SpO2:       Weight:       Height:        No LMP recorded. Patient is pregnant.   HT: 5' 3" (160 cm)  WT: 83.9 kg (185 lb)  BMI: 32.8     Temp:  [98 °F (36.7 °C)-98.6 °F (37 °C)] 98.1 °F (36.7 °C)  Pulse:  [75-94] 75  Resp:  [16-18] 17  BP: (101-121)/(60-62) 110/60  Physical Exam  Constitutional:       General: She is not in acute distress.  Pulmonary:      Effort: Pulmonary effort is normal. No respiratory distress.   Abdominal:      Palpations: Abdomen is soft.      Tenderness: There is no abdominal tenderness.      Comments: Gravid uterus, no uterine tenderness   Musculoskeletal:      Right lower leg: No edema.      Left lower leg: No edema.   Skin:     General: Skin is warm and dry.   Neurological:      General: No focal deficit present.      Mental Status: She is alert and oriented to person, place, and time.   Psychiatric:         Mood and Affect: Mood normal.         Behavior: Behavior normal.         Thought Content: Thought content normal.       Recent Results (from the past 48 hour(s))   CBC with Differential    Collection Time: 08/24/22  6:00 AM   Result Value Ref Range    WBC 13.0 (H) 4.5 - 11.5 x10(3)/mcL    RBC 3.80 (L) 4.20 - 5.40 x10(6)/mcL    Hgb 12.2 12.0 - 16.0 gm/dL    Hct 36.5 (L) 37.0 - 47.0 %    MCV 96.1 (H) 80.0 - 94.0 fL    MCH 32.1 (H) 27.0 - 31.0 pg    MCHC 33.4 33.0 - 36.0 mg/dL    RDW 13.0 11.5 - 17.0 %    Platelet 259 130 - 400 x10(3)/mcL    MPV 9.9 7.4 - 10.4 fL    Neut % 67.2 %    Lymph % 21.2 %    Mono % 7.9 %    Eos % 1.9 %    Basophil % 0.3 %    Lymph # 2.75 0.6 - 4.6 x10(3)/mcL    Neut # 8.7 2.1 - 9.2 x10(3)/mcL    Mono # 1.03 0.1 - 1.3 x10(3)/mcL    Eos # 0.25 0 - 0.9 x10(3)/mcL    Baso # 0.04 0 - 0.2 x10(3)/mcL    IG# 0.20 (H) 0 - 0.04 x10(3)/mcL    " IG% 1.5 %    NRBC% 0.0 %      US OB Non Rad 14+ Weeks TransAbd, w/Biophysical Profile, w/o NST (xpd)  See Provider Notes for results.     IMPRESSION: Please see provider notes for interpretation    This procedure was auto-finalized by: Virtual Radiologist      Assessment and Plan  w5d   - premature rupture of membranes  FHT this morning 135 baseline, moderate variability, 15x15 accels, rare small variable decels, no contractions.   biophysical profile every other day, next due on   Continue weekly [place]  She was advised to report any abdominal pain, cramping, vaginal bleeding, abnormal discharge, fever, chills     -VTE prophylaxis  JOHANN's and frequent movement of BLE    Examined patient around 8 am. No complaints. No uterine tenderness, reactive NST, continue current care, BPP in am.     Components of this note were documented using voice recognition systems; and are subject to errors not corrected at proof reading.  Please contact the author for any clarifications.

## 2022-08-26 PROCEDURE — 11000001 HC ACUTE MED/SURG PRIVATE ROOM

## 2022-08-26 PROCEDURE — 72100003 HC LABOR CARE, EA. ADDL. 8 HRS

## 2022-08-26 PROCEDURE — 25000003 PHARM REV CODE 250: Performed by: NURSE PRACTITIONER

## 2022-08-26 RX ADMIN — ASPIRIN 81 MG: 81 TABLET, COATED ORAL at 09:08

## 2022-08-26 NOTE — PROCEDURE NOTE ADDENDUM
BIOPHYSICAL PROFILE  REPORT    DATE OF ULTRASOUND: 2022     INDICATION: PROM    Gestational Age: 29w6d     NST:  2/2 baseline around 135 beats per minute during morning rounds with accelerations  Movement: 2/2  Tone:  2/2  Fluid:  0/2  Breathin/2        ЕЛЕНА:  1.0    Presentation: Cephalic        Impression:  Biophysical profile 8/10 with oligohydramnios        Parmjit Ovalle MD       Components of this note were documented using voice recognition systems; and are subject to errors not corrected at proof reading. Please contact author for any clarifications.

## 2022-08-26 NOTE — PROGRESS NOTES
Ms. Rodriguez is 29 6/7 weeks gestation admitted with  premature rupture of membranes. She is s/p Celstone x 2 on 8-15 and 22 as well as course of magnesium sulfate for neuroprotection. She has also completed the course of broad spectrum antibiotic therapy. She is on weekly [place] (on ) due to history of PTD at 28 weeks. She is on prophylactic baby aspirin for preeclampsia prophylaxis. She has no complaints this morning. She reports good fetal movement, and she denies any abdominal pain or cramping, vaginal bleeding, abnormal discharge, fever, chills.    Review of Systems   Constitutional: Negative.    Eyes: Negative.    Respiratory: Negative.    Cardiovascular: Negative.    Gastrointestinal: Negative.    Endocrine: Negative.    Genitourinary: Negative.    Musculoskeletal: Negative.    Skin: Negative.    Allergic/Immunologic: Negative.    Neurological: Negative.    Hematological: Negative.    Psychiatric/Behavioral: Negative.          History reviewed. No pertinent past medical history.   Past Surgical History:   Procedure Laterality Date    ADENOIDECTOMY      MYRINGOTOMY W/ TUBES      TONSILLECTOMY        Social History     Socioeconomic History    Marital status: Single   Tobacco Use    Smoking status: Never Smoker    Smokeless tobacco: Never Used   Substance and Sexual Activity    Alcohol use: Not Currently    Drug use: Never    Sexual activity: Yes      Family History   Problem Relation Age of Onset    Diabetes Mother     COPD Mother     Hypertension Son       Review of patient's allergies indicates:  No Known Allergies   Prior to Admission medications    Medication Sig Start Date End Date Taking? Authorizing Provider   HYDROXYprogesterone (PERLA) 250 mg/mL (1 mL) Oil Inject 250 mg into the muscle every 7 days.    Historical Provider   prenatal vit 10-iron fum-folic (VITAFOL-OB) 65-1 mg Tab Take 1 tab by mouth daily 5/3/22   Alan Ricketts MD            Vitals:    22  "2257 22 0004 22 0428 22 0700   BP: 104/62  (!) 100/54 102/60   Pulse: 83  82 71   Resp: 18  18 18   Temp: 98.2 °F (36.8 °C) 98.2 °F (36.8 °C) 98.2 °F (36.8 °C) 97.3 °F (36.3 °C)   TempSrc:   Oral    SpO2:       Weight:       Height:        No LMP recorded. Patient is pregnant.   HT: 5' 3" (160 cm)  WT: 83.9 kg (185 lb)  BMI: 32.8     Temp:  [97.3 °F (36.3 °C)-99.1 °F (37.3 °C)] 97.3 °F (36.3 °C)  Pulse:  [71-93] 71  Resp:  [17-18] 18  BP: (100-111)/(54-62) 102/60  Physical Exam  Constitutional:       General: She is not in acute distress.  Pulmonary:      Effort: Pulmonary effort is normal. No respiratory distress.   Abdominal:      Palpations: Abdomen is soft.      Tenderness: There is no abdominal tenderness.      Comments: Gravid uterus, no uterine tenderness   Musculoskeletal:      Right lower leg: No edema.      Left lower leg: No edema.   Skin:     General: Skin is warm and dry.   Neurological:      General: No focal deficit present.      Mental Status: She is alert and oriented to person, place, and time.   Psychiatric:         Mood and Affect: Mood normal.         Behavior: Behavior normal.         Thought Content: Thought content normal.       No results found for this or any previous visit (from the past 48 hour(s)).   US OB Non Rad 14+ Weeks TransAbd, w/Biophysical Profile, w/o NST (xpd)  See Provider Notes for results.     IMPRESSION: Please see provider notes for interpretation    This procedure was auto-finalized by: Virtual Radiologist      Assessment and Plan  29w6d   - premature rupture of membranes  VSS, afebrile, no uterine tenderness  FHT this morning reassuring with 135 baseline, moderate variability, 15x15 accels present, rare small variable decels, no contractions  Biophysical profile this am 8/10 with oligohydramnios. Continue biophysical profile every other day (next due 8-)  She was advised to report any abdominal pain, cramping, vaginal bleeding, abnormal " discharge, fever, chills    -VTE prophylaxis  JOHANN's and frequent movement of BLE      Examined patient with NP morning around 7:45 a.m..  BIOPHYSICAL PROFILE IS 8/10.  THERE IS NO UTERINE tenderness on examination.  Will continue current care.  Components of this note were documented using voice recognition systems; and are subject to errors not corrected at proof reading.  Please contact the author for any clarifications.

## 2022-08-26 NOTE — PROGRESS NOTES
iup 29-6     No complaints reports good fetal movement denies fever chills and pain  vss afebrile  aao3  gavid nt  extn t  nst cat one  toco quiet    cw em

## 2022-08-26 NOTE — PROGRESS NOTES
"Nutrition   Progress Note      Recommendations:  1. Continue regular diet as tolerated  2. RD to monitor po intake and weight changes      Reason for Evaluation:  Length of Stay, Continuous nutrition monitoring    Diagnosis:    1.  premature rupture of membranes (PPROM) with unknown onset of labor        Relevant Medical History:  History reviewed. No pertinent past medical history.      Nutrition Diet History:    Factors affecting nutritional intake: none identified at this time    Food / [place]ism / Culture Preferences:  n/a      Nutrition Prescription Ordered:    Current Diet Order: regular    Appetite:  Good (> 75% - 100% po intake)    PO intake: 75 - 100 %      Labs / Medications / Procedures:    Nutrition Related Medications: none nutritionally pertinent    Nutrition Related Labs:  No new labs      Anthropometrics:  Height: 5' 3" (1.6 m)  Admit Weight:  Weight: 83.9 kg (185 lb)  Latest Weight:  83.9 kg (185 lb)    Wt Readings from Last 5 Encounters:   22 83.9 kg (185 lb)   22 83.5 kg (184 lb)   22 83.5 kg (184 lb)   22 80.7 kg (178 lb)   22 81.6 kg (180 lb)     IBW: 52.3 kg  %IBW: 160.4%  UBW: 83.9 kg  %Weight Change: 0%  BMI classification:  BMI not appropriate due to pregnancy      Nutrition Narrative:  : pt reports good appetite, with no n/v/d/c. Pt reported last known weight of 83.9 kg (185 lb) with no recent weight loss.   : Pt reports good appetite, no concerns at this time.     Monitoring and Evaluation:    Nutrition Monitoring and Evaluation:  food and beverage intake and weight change    Nutrition Risk:  Level of Nutrition Risk:  Low  Frequency of Follow up:  Dietitian will f/up within 7 days.            "

## 2022-08-26 NOTE — PROCEDURE NOTE ADDENDUM
NST    Indication: PROM    Around 8 am, FHR this morning 135 baseline, moderate variability, 15x15 accels, rare small variable decels on prolonged monitoring, no contractions.     Impression: reactive NST.

## 2022-08-27 LAB
BASOPHILS # BLD AUTO: 0.04 X10(3)/MCL (ref 0–0.2)
BASOPHILS NFR BLD AUTO: 0.3 %
EOSINOPHIL # BLD AUTO: 0.27 X10(3)/MCL (ref 0–0.9)
EOSINOPHIL NFR BLD AUTO: 1.9 %
ERYTHROCYTE [DISTWIDTH] IN BLOOD BY AUTOMATED COUNT: 13 % (ref 11.5–17)
HCT VFR BLD AUTO: 34.1 % (ref 37–47)
HGB BLD-MCNC: 11.4 GM/DL (ref 12–16)
IMM GRANULOCYTES # BLD AUTO: 0.15 X10(3)/MCL (ref 0–0.04)
IMM GRANULOCYTES NFR BLD AUTO: 1 %
LYMPHOCYTES # BLD AUTO: 2.93 X10(3)/MCL (ref 0.6–4.6)
LYMPHOCYTES NFR BLD AUTO: 20.4 %
MCH RBC QN AUTO: 32 PG (ref 27–31)
MCHC RBC AUTO-ENTMCNC: 33.4 MG/DL (ref 33–36)
MCV RBC AUTO: 95.8 FL (ref 80–94)
MONOCYTES # BLD AUTO: 0.94 X10(3)/MCL (ref 0.1–1.3)
MONOCYTES NFR BLD AUTO: 6.6 %
NEUTROPHILS # BLD AUTO: 10 X10(3)/MCL (ref 2.1–9.2)
NEUTROPHILS NFR BLD AUTO: 69.8 %
NRBC BLD AUTO-RTO: 0 %
PLATELET # BLD AUTO: 230 X10(3)/MCL (ref 130–400)
PMV BLD AUTO: 10.1 FL (ref 7.4–10.4)
RBC # BLD AUTO: 3.56 X10(6)/MCL (ref 4.2–5.4)
WBC # SPEC AUTO: 14.3 X10(3)/MCL (ref 4.5–11.5)

## 2022-08-27 PROCEDURE — 36415 COLL VENOUS BLD VENIPUNCTURE: CPT | Performed by: OBSTETRICS & GYNECOLOGY

## 2022-08-27 PROCEDURE — 11000001 HC ACUTE MED/SURG PRIVATE ROOM

## 2022-08-27 PROCEDURE — 25000003 PHARM REV CODE 250: Performed by: NURSE PRACTITIONER

## 2022-08-27 PROCEDURE — 72100003 HC LABOR CARE, EA. ADDL. 8 HRS

## 2022-08-27 PROCEDURE — 85025 COMPLETE CBC W/AUTO DIFF WBC: CPT | Performed by: OBSTETRICS & GYNECOLOGY

## 2022-08-27 RX ADMIN — ASPIRIN 81 MG: 81 TABLET, COATED ORAL at 08:08

## 2022-08-27 NOTE — PROGRESS NOTES
[facility]                       [address] [id number]    PATIENT NAME:       ALEXANDER RODRIGUEZ   YOB: 2000  CSN:                [id number]   MRN:                [id number]  ADMIT DATE:         08/15/2022 09:18:00  PHYSICIAN:          Alan Ricketts MD                            PROGRESS NOTE    DATE:      SUBJECTIVE:  Ms. Rodriguez is seen at the bedside this morning.  She has no   complaints.  She denies fevers, chills, chest pain, shortness of breath, nausea,   vomiting.  Reports good fetal movement.  Reports continued leakage of fluid.    Denies vaginal bleeding and contractions.    OBJECTIVE:  VITAL SIGNS:  Stable.  She is afebrile.  GENERAL:  She is alert, oriented x3.  ABDOMEN:  Gravid, nontender.  EXTREMITIES:  Nontender.    , cat 1.  Blunt quiet.    ASSESSMENT:  This is a 22-year-old  4, para 0-1-2-1, 30 weeks, with    premature rupture of the membranes, currently stable.    PLAN:  Continue with monitoring.    I appreciate Dr. Parmjit Ovalle' help.        ______________________________  Alan Ricketts MD    EPE/AQS  DD:  2022  Time:  11:34AM  DT:  2022  Time:  11:47AM  Job #:  [id number]      PROGRESS NOTE

## 2022-08-27 NOTE — PROGRESS NOTES
30 0/7 weeks gestation, currently admitted with  premature rupture of membranes. She is s/p Celstone x 2 on 8-15 and 22 and s/p magnesium sulfate for neuroprotection. Also s/p broad spectrum antibiotic therapy.. She has history of PTD in last pregnancy at 28 weeks. She is on weekly [place] (on ). She is on prophylactic baby aspirin for preeclampsia prophylaxis.Reports some leakage of clear fluid. Reports some mild cramping yesterday but none today. She reports good fetal movement this morning and she denies any vaginal bleeding, fever, chills.        Review of Systems   Constitutional:  Negative for fever.   Eyes:  Negative for visual disturbance.   Respiratory:  Negative for shortness of breath.    Cardiovascular:  Negative for chest pain.   Gastrointestinal:  Negative for abdominal pain.   Genitourinary:  Negative for vaginal bleeding.   Neurological:  Negative for headaches.   [unfilled]    History reviewed. No pertinent past medical history.   Past Surgical History:   Procedure Laterality Date    ADENOIDECTOMY      MYRINGOTOMY W/ TUBES      TONSILLECTOMY        Social History     Socioeconomic History    Marital status: Single   Tobacco Use    Smoking status: Never    Smokeless tobacco: Never   Substance and Sexual Activity    Alcohol use: Not Currently    Drug use: Never    Sexual activity: Yes      Family History   Problem Relation Age of Onset    Diabetes Mother     COPD Mother     Hypertension Son       Review of patient's allergies indicates:  No Known Allergies   Prior to Admission medications    Medication Sig Start Date End Date Taking? Authorizing Provider   HYDROXYprogesterone (PERLA) 250 mg/mL (1 mL) Oil Inject 250 mg into the muscle every 7 days.    Historical Provider   prenatal vit 10-iron fum-folic (VITAFOL-OB) 65-1 mg Tab Take 1 tab by mouth daily 5/3/22   Alan Ricketts MD            Vitals:    22 1937 22 2251 22 0105 22 0725   BP: 101/62 126/66  "102/60 (!) 94/53   Pulse: 79 80 70 82   Resp: 18 18 18 16   Temp: 98.2 °F (36.8 °C) 98.1 °F (36.7 °C) 98.2 °F (36.8 °C) 97.7 °F (36.5 °C)   TempSrc:    Oral   SpO2:    100%   Weight:       Height:        No LMP recorded. Patient is pregnant.   HT: 5' 3" (160 cm)  WT: 83.9 kg (185 lb)  BMI: 32.8     Temp:  [97.2 °F (36.2 °C)-98.2 °F (36.8 °C)] 97.7 °F (36.5 °C)  Pulse:  [70-88] 82  Resp:  [16-18] 16  SpO2:  [100 %] 100 %  BP: ()/(53-66) 94/53  Physical Exam  Constitutional:       Appearance: Normal appearance.   Cardiovascular:      Rate and Rhythm: Normal rate.   Pulmonary:      Effort: Pulmonary effort is normal.   Abdominal:      Palpations: Abdomen is soft.   Musculoskeletal:         General: Normal range of motion.   Skin:     General: Skin is warm and dry.   Neurological:      Mental Status: She is alert and oriented to person, place, and time.   Psychiatric:         Mood and Affect: Mood normal.     Recent Results (from the past 48 hour(s))   CBC with Differential    Collection Time: 08/27/22  6:24 AM   Result Value Ref Range    WBC 14.3 (H) 4.5 - 11.5 x10(3)/mcL    RBC 3.56 (L) 4.20 - 5.40 x10(6)/mcL    Hgb 11.4 (L) 12.0 - 16.0 gm/dL    Hct 34.1 (L) 37.0 - 47.0 %    MCV 95.8 (H) 80.0 - 94.0 fL    MCH 32.0 (H) 27.0 - 31.0 pg    MCHC 33.4 33.0 - 36.0 mg/dL    RDW 13.0 11.5 - 17.0 %    Platelet 230 130 - 400 x10(3)/mcL    MPV 10.1 7.4 - 10.4 fL    Neut % 69.8 %    Lymph % 20.4 %    Mono % 6.6 %    Eos % 1.9 %    Basophil % 0.3 %    Lymph # 2.93 0.6 - 4.6 x10(3)/mcL    Neut # 10.0 (H) 2.1 - 9.2 x10(3)/mcL    Mono # 0.94 0.1 - 1.3 x10(3)/mcL    Eos # 0.27 0 - 0.9 x10(3)/mcL    Baso # 0.04 0 - 0.2 x10(3)/mcL    IG# 0.15 (H) 0 - 0.04 x10(3)/mcL    IG% 1.0 %    NRBC% 0.0 %      US OB Non Rad 14+ Weeks TransAbd, w/Biophysical Profile, w/o NST (xpd)  See Provider Notes for results.     IMPRESSION: Please see provider notes for interpretation    This procedure was auto-finalized by: Virtual " Radiologist        Assessment and Plan  30w0d  premature rupture of membranes. Abdomen soft/nontender. BPP every other day, next tomorrow am. Fetal tracing 135 baseline, moderate variability, accelerations. Continue current monitoring. Continue expectant management at this time.     -previous PTD. Continue weekly [place].

## 2022-08-28 PROCEDURE — 25000003 PHARM REV CODE 250: Performed by: OBSTETRICS & GYNECOLOGY

## 2022-08-28 PROCEDURE — 72100002 HC LABOR CARE, 1ST 8 HOURS

## 2022-08-28 PROCEDURE — A4216 STERILE WATER/SALINE, 10 ML: HCPCS | Performed by: OBSTETRICS & GYNECOLOGY

## 2022-08-28 PROCEDURE — 72100003 HC LABOR CARE, EA. ADDL. 8 HRS

## 2022-08-28 PROCEDURE — 25000003 PHARM REV CODE 250: Performed by: NURSE PRACTITIONER

## 2022-08-28 PROCEDURE — 11000001 HC ACUTE MED/SURG PRIVATE ROOM

## 2022-08-28 RX ORDER — ACETAMINOPHEN 325 MG/1
650 TABLET ORAL EVERY 6 HOURS PRN
Status: DISCONTINUED | OUTPATIENT
Start: 2022-08-28 | End: 2022-09-13 | Stop reason: HOSPADM

## 2022-08-28 RX ADMIN — ASPIRIN 81 MG: 81 TABLET, COATED ORAL at 08:08

## 2022-08-28 RX ADMIN — ACETAMINOPHEN 650 MG: 325 TABLET ORAL at 09:08

## 2022-08-28 RX ADMIN — SODIUM CHLORIDE, PRESERVATIVE FREE 10 ML: 5 INJECTION INTRAVENOUS at 06:08

## 2022-08-28 NOTE — PROGRESS NOTES
30 1/7 weeks gestation, currently admitted with  premature rupture of membranes. She is s/p Celstone x 2 on 8-15 and 22 and s/p magnesium sulfate for neuroprotection. Also s/p broad spectrum antibiotic therapy.. She has history of PTD in last pregnancy at 28 weeks. She is on weekly [place] (on ). She is on prophylactic baby aspirin for preeclampsia prophylaxis.Reports some leakage of clear fluid. She reports good fetal movement this morning and she denies any vaginal bleeding, fever, chills, cramps.      Review of Systems   Constitutional:  Negative for fever.   Eyes:  Negative for visual disturbance.   Respiratory:  Negative for shortness of breath.    Cardiovascular:  Negative for chest pain.   Gastrointestinal:  Negative for abdominal pain.   Genitourinary:  Negative for vaginal bleeding.   Neurological:  Negative for headaches.   [unfilled]    History reviewed. No pertinent past medical history.   Past Surgical History:   Procedure Laterality Date    ADENOIDECTOMY      MYRINGOTOMY W/ TUBES      TONSILLECTOMY        Social History     Socioeconomic History    Marital status: Single   Tobacco Use    Smoking status: Never    Smokeless tobacco: Never   Substance and Sexual Activity    Alcohol use: Not Currently    Drug use: Never    Sexual activity: Yes      Family History   Problem Relation Age of Onset    Diabetes Mother     COPD Mother     Hypertension Son       Review of patient's allergies indicates:  No Known Allergies   Prior to Admission medications    Medication Sig Start Date End Date Taking? Authorizing Provider   HYDROXYprogesterone (PERLA) 250 mg/mL (1 mL) Oil Inject 250 mg into the muscle every 7 days.    Historical Provider   prenatal vit 10-iron fum-folic (VITAFOL-OB) 65-1 mg Tab Take 1 tab by mouth daily 5/3/22   Alan Ricketts MD            Vitals:    22 2330 22 2340 22 0620 22 0712   BP: (!) 99/58  (!) 102/58 103/64   Pulse: 78  72 80   Resp:   18  "18   Temp:  98.3 °F (36.8 °C) 97.9 °F (36.6 °C) 98.3 °F (36.8 °C)   TempSrc:       SpO2:    100%   Weight:       Height:        No LMP recorded. Patient is pregnant.   HT: 5' 3" (160 cm)  WT: 83.9 kg (185 lb)  BMI: 32.8     Temp:  [97.9 °F (36.6 °C)-98.3 °F (36.8 °C)] 98.3 °F (36.8 °C)  Pulse:  [72-85] 80  Resp:  [18] 18  SpO2:  [100 %] 100 %  BP: ()/(58-79) 103/64  Physical Exam  Constitutional:       Appearance: Normal appearance.   Cardiovascular:      Rate and Rhythm: Normal rate.   Pulmonary:      Effort: Pulmonary effort is normal.   Abdominal:      Palpations: Abdomen is soft.   Musculoskeletal:         General: Normal range of motion.   Skin:     General: Skin is warm and dry.   Neurological:      Mental Status: She is alert and oriented to person, place, and time.   Psychiatric:         Mood and Affect: Mood normal.     Recent Results (from the past 48 hour(s))   CBC with Differential    Collection Time: 22  6:24 AM   Result Value Ref Range    WBC 14.3 (H) 4.5 - 11.5 x10(3)/mcL    RBC 3.56 (L) 4.20 - 5.40 x10(6)/mcL    Hgb 11.4 (L) 12.0 - 16.0 gm/dL    Hct 34.1 (L) 37.0 - 47.0 %    MCV 95.8 (H) 80.0 - 94.0 fL    MCH 32.0 (H) 27.0 - 31.0 pg    MCHC 33.4 33.0 - 36.0 mg/dL    RDW 13.0 11.5 - 17.0 %    Platelet 230 130 - 400 x10(3)/mcL    MPV 10.1 7.4 - 10.4 fL    Neut % 69.8 %    Lymph % 20.4 %    Mono % 6.6 %    Eos % 1.9 %    Basophil % 0.3 %    Lymph # 2.93 0.6 - 4.6 x10(3)/mcL    Neut # 10.0 (H) 2.1 - 9.2 x10(3)/mcL    Mono # 0.94 0.1 - 1.3 x10(3)/mcL    Eos # 0.27 0 - 0.9 x10(3)/mcL    Baso # 0.04 0 - 0.2 x10(3)/mcL    IG# 0.15 (H) 0 - 0.04 x10(3)/mcL    IG% 1.0 %    NRBC% 0.0 %      US OB Non Rad 14+ Weeks TransAbd, w/Biophysical Profile, w/o NST (xpd)  See Provider Notes for results.     IMPRESSION: Please see provider notes for interpretation    This procedure was auto-finalized by: Virtual Radiologist        Assessment and Plan  30w1d  premature rupture of membranes. Abdomen " soft/nontender. Preliminary BPP 6/8 (0 for fluid) with ЕЛЕНА 2.2. Dr Ovalle to provide final read. Fetal tracing reassuring and reactive with 135 baseline, moderate variability, 15x15 accelerations. Continue current monitoring. Continue expectant management at this time.     -previous PTD. Continue weekly Audra.    -VTE prophylaxis: Continue frequent movement of BLE

## 2022-08-28 NOTE — PROGRESS NOTES
Pt seen this am no complaints  Reports good fm denies pain and contractions   Denied fever  +lof  Vss afebrile  Aao3  Abd gravid   Ext nt    Iup 30-1 pprom stable cw em

## 2022-08-29 PROCEDURE — 11000001 HC ACUTE MED/SURG PRIVATE ROOM

## 2022-08-29 PROCEDURE — 25000003 PHARM REV CODE 250: Performed by: NURSE PRACTITIONER

## 2022-08-29 PROCEDURE — 72100003 HC LABOR CARE, EA. ADDL. 8 HRS

## 2022-08-29 RX ADMIN — ASPIRIN 81 MG: 81 TABLET, COATED ORAL at 09:08

## 2022-08-29 NOTE — PROGRESS NOTES
30 2/7 weeks gestation, currently admitted with  premature rupture of membranes. She is s/p Celstone x 2 on 8-15 and 22 and s/p magnesium sulfate for neuroprotection. Also s/p broad spectrum antibiotic therapy.. She has history of PTD in last pregnancy at 28 weeks. She is on weekly [place] [facility] [place] (on ). She is on prophylactic baby aspirin for preeclampsia prophylaxis.Reports still leaking clear fluid and occasional cramps 'here and there' She reports good fetal movement this morning and she denies any vaginal bleeding, fever, chills    Review of Systems   Constitutional:  Negative for fever.   Eyes:  Negative for visual disturbance.   Respiratory:  Negative for shortness of breath.    Cardiovascular:  Negative for chest pain.   Gastrointestinal:  Negative for abdominal pain.   Genitourinary:  Negative for vaginal bleeding.   Neurological:  Negative for headaches.   [unfilled]    History reviewed. No pertinent past medical history.   Past Surgical History:   Procedure Laterality Date    ADENOIDECTOMY      MYRINGOTOMY W/ TUBES      TONSILLECTOMY        Social History     Socioeconomic History    Marital status: Single   Tobacco Use    Smoking status: Never    Smokeless tobacco: Never   Substance and Sexual Activity    Alcohol use: Not Currently    Drug use: Never    Sexual activity: Yes      Family History   Problem Relation Age of Onset    Diabetes Mother     COPD Mother     Hypertension Son       Review of patient's allergies indicates:  No Known Allergies   Prior to Admission medications    Medication Sig Start Date End Date Taking? Authorizing Provider   HYDROXYprogesterone (PERLA) 250 mg/mL (1 mL) Oil Inject 250 mg into the muscle every 7 days.    Historical Provider   prenatal vit 10-iron fum-folic (VITAFOL-OB) 65-1 mg Tab Take 1 tab by mouth daily 5/3/22   Alan Ricketts MD            Vitals:    22 0241 22 0243 22 0626 22 0707   BP: (!) 96/55   (!) 103/59   Pulse: 73   " 73   Resp: 15   18   Temp:  98.2 °F (36.8 °C) 98.8 °F (37.1 °C) 98.5 °F (36.9 °C)   TempSrc:       SpO2:    100%   Weight:       Height:        No LMP recorded. Patient is pregnant.   HT: 5' 3" (160 cm)  WT: 83.9 kg (185 lb)  BMI: 32.8     Temp:  [97.9 °F (36.6 °C)-99.7 °F (37.6 °C)] 98.5 °F (36.9 °C)  Pulse:  [73-93] 73  Resp:  [15-18] 18  SpO2:  [100 %] 100 %  BP: ()/(52-60) 103/59  Physical Exam  Constitutional:       Appearance: Normal appearance.   Cardiovascular:      Rate and Rhythm: Normal rate.   Pulmonary:      Effort: Pulmonary effort is normal.   Abdominal:      Palpations: Abdomen is soft.   Musculoskeletal:         General: Normal range of motion.   Skin:     General: Skin is warm and dry.   Neurological:      Mental Status: She is alert and oriented to person, place, and time.   Psychiatric:         Mood and Affect: Mood normal.     No results found for this or any previous visit (from the past 48 hour(s)).   US OB Non Rad 14+ Weeks TransAbd, w/Biophysical Profile, w/o NST (xpd)  See Provider Notes for results.     IMPRESSION: Please see provider notes for interpretation    This procedure was auto-finalized by: Virtual Radiologist        Assessment and Plan  30w2d  premature rupture of membranes. Abdomen soft/nontender. BPP tomorrow. Fetal tracing reassuring and reactive with 1350baseline, moderate variability, 15x15 accelerations, occasional variable deceleration on prolonged monitoring. Delivery not indicated at this time. Continue current monitoring.   Advised to report increased cramping, vaginal bleeding.     -previous PTD. Continue weekly Audra.     -VTE prophylaxis: Continue frequent movement of BLE    I examined the patient around 9:00 a.m..  She had no complaints.  NST was reactive.  There was no uterine tenderness.  Continue current care.    Components of this note were documented using voice recognition systems; and are subject to errors not corrected at proof reading.  " Please contact the author for any clarifications.

## 2022-08-29 NOTE — PROGRESS NOTES
Pt seen this am   No complaints   She denies fever chills and pain  Reports good fm and lof  Vss afebrile  Aao3  Abd nt nd gravid  Ext nt    Iup 30-2 pprom  Cw em

## 2022-08-29 NOTE — PROCEDURE NOTE ADDENDUM
BIOPHYSICAL PROFILE  REPORT    DATE OF ULTRASOUND: 22     INDICATION: PROM and oligohydramnios     Gestational Age: 30w2d     Movement: 2/2  Tone:  2/2  Fluid:  0/2   Breathin/2    FHR:  134 bpm during ultrasound    ЕЛЕНА: 2.2    Presentation: Cephalic        Impression:  Biophysical profile          Parmjit Ovalle MD       Components of this note were documented using voice recognition systems; and are subject to errors not corrected at proof reading. Please contact author for any clarifications.

## 2022-08-30 LAB
BASOPHILS # BLD AUTO: 0.04 X10(3)/MCL (ref 0–0.2)
BASOPHILS NFR BLD AUTO: 0.4 %
EOSINOPHIL # BLD AUTO: 0.25 X10(3)/MCL (ref 0–0.9)
EOSINOPHIL NFR BLD AUTO: 2.3 %
ERYTHROCYTE [DISTWIDTH] IN BLOOD BY AUTOMATED COUNT: 13.2 % (ref 11.5–17)
HCT VFR BLD AUTO: 33.8 % (ref 37–47)
HGB BLD-MCNC: 11.2 GM/DL (ref 12–16)
IMM GRANULOCYTES # BLD AUTO: 0.12 X10(3)/MCL (ref 0–0.04)
IMM GRANULOCYTES NFR BLD AUTO: 1.1 %
LYMPHOCYTES # BLD AUTO: 2.44 X10(3)/MCL (ref 0.6–4.6)
LYMPHOCYTES NFR BLD AUTO: 22.1 %
MCH RBC QN AUTO: 32.2 PG (ref 27–31)
MCHC RBC AUTO-ENTMCNC: 33.1 MG/DL (ref 33–36)
MCV RBC AUTO: 97.1 FL (ref 80–94)
MONOCYTES # BLD AUTO: 0.93 X10(3)/MCL (ref 0.1–1.3)
MONOCYTES NFR BLD AUTO: 8.4 %
NEUTROPHILS # BLD AUTO: 7.3 X10(3)/MCL (ref 2.1–9.2)
NEUTROPHILS NFR BLD AUTO: 65.7 %
NRBC BLD AUTO-RTO: 0 %
PLATELET # BLD AUTO: 236 X10(3)/MCL (ref 130–400)
PMV BLD AUTO: 10.1 FL (ref 7.4–10.4)
RBC # BLD AUTO: 3.48 X10(6)/MCL (ref 4.2–5.4)
WBC # SPEC AUTO: 11 X10(3)/MCL (ref 4.5–11.5)

## 2022-08-30 PROCEDURE — 36415 COLL VENOUS BLD VENIPUNCTURE: CPT | Performed by: OBSTETRICS & GYNECOLOGY

## 2022-08-30 PROCEDURE — 11000001 HC ACUTE MED/SURG PRIVATE ROOM

## 2022-08-30 PROCEDURE — 25000003 PHARM REV CODE 250: Performed by: NURSE PRACTITIONER

## 2022-08-30 PROCEDURE — 85025 COMPLETE CBC W/AUTO DIFF WBC: CPT | Performed by: OBSTETRICS & GYNECOLOGY

## 2022-08-30 PROCEDURE — 72100003 HC LABOR CARE, EA. ADDL. 8 HRS

## 2022-08-30 RX ADMIN — ASPIRIN 81 MG: 81 TABLET, COATED ORAL at 09:08

## 2022-08-30 NOTE — PROGRESS NOTES
Ms. Rodriguez is 30 3/7 weeks gestation currently admitted with  premature rupture of membranes. She is s/p Celstone x 2 on 8-15 and 22, magnesium sulfate for neuroprotection, and broad spectrum antibiotic therapy. She has history of PTD in last pregnancy at 28 weeks, and she is on weekly [place] (on ). She is on prophylactic baby aspirin for preeclampsia prophylaxis. She has no complaints this morning. She reports good fetal movement. She denies any cramping or abdominal pain, vaginal bleeding, abnormal discharge, fever, chills    Review of Systems   Constitutional: Negative.    Eyes: Negative.    Respiratory: Negative.     Cardiovascular: Negative.    Gastrointestinal: Negative.    Endocrine: Negative.    Genitourinary: Negative.    Musculoskeletal: Negative.    Skin: Negative.    Allergic/Immunologic: Negative.    Neurological: Negative.    Hematological: Negative.    Psychiatric/Behavioral: Negative.         History reviewed. No pertinent past medical history.   Past Surgical History:   Procedure Laterality Date    ADENOIDECTOMY      MYRINGOTOMY W/ TUBES      TONSILLECTOMY        Social History     Socioeconomic History    Marital status: Single   Tobacco Use    Smoking status: Never    Smokeless tobacco: Never   Substance and Sexual Activity    Alcohol use: Not Currently    Drug use: Never    Sexual activity: Yes      Family History   Problem Relation Age of Onset    Diabetes Mother     COPD Mother     Hypertension Son       Review of patient's allergies indicates:  No Known Allergies   Prior to Admission medications    Medication Sig Start Date End Date Taking? Authorizing Provider   HYDROXYprogesterone (PERLA) 250 mg/mL (1 mL) Oil Inject 250 mg into the muscle every 7 days.    Historical Provider   prenatal vit 10-iron fum-folic (VITAFOL-OB) 65-1 mg Tab Take 1 tab by mouth daily 5/3/22   Alan Ricketts MD            Vitals:    22 0000 22 0400 22 0703 22 1117  "  BP: (!) 99/59 (!) 116/58 (!) 102/57 103/61   Pulse: 75 83 76 85   Resp: 18 18 16 18   Temp: 98.2 °F (36.8 °C) 98.2 °F (36.8 °C) 96.8 °F (36 °C) 96.3 °F (35.7 °C)   TempSrc:       SpO2:       Weight:       Height:        No LMP recorded. Patient is pregnant.   HT: 5' 3" (160 cm)  WT: 83.9 kg (185 lb)  BMI: 32.8     Temp:  [96.3 °F (35.7 °C)-99 °F (37.2 °C)] 96.3 °F (35.7 °C)  Pulse:  [75-89] 85  Resp:  [16-18] 18  BP: ()/(55-61) 103/61  Physical Exam  Pulmonary:      Effort: Pulmonary effort is normal. No respiratory distress.   Abdominal:      Palpations: Abdomen is soft.      Tenderness: There is no abdominal tenderness.      Comments: Gravid uterus, no uterine tenderness   Skin:     General: Skin is warm and dry.   Neurological:      General: No focal deficit present.      Mental Status: She is alert and oriented to person, place, and time.   Psychiatric:         Mood and Affect: Mood normal.         Behavior: Behavior normal.         Thought Content: Thought content normal.     Recent Results (from the past 48 hour(s))   CBC with Differential    Collection Time: 08/30/22  6:03 AM   Result Value Ref Range    WBC 11.0 4.5 - 11.5 x10(3)/mcL    RBC 3.48 (L) 4.20 - 5.40 x10(6)/mcL    Hgb 11.2 (L) 12.0 - 16.0 gm/dL    Hct 33.8 (L) 37.0 - 47.0 %    MCV 97.1 (H) 80.0 - 94.0 fL    MCH 32.2 (H) 27.0 - 31.0 pg    MCHC 33.1 33.0 - 36.0 mg/dL    RDW 13.2 11.5 - 17.0 %    Platelet 236 130 - 400 x10(3)/mcL    MPV 10.1 7.4 - 10.4 fL    Neut % 65.7 %    Lymph % 22.1 %    Mono % 8.4 %    Eos % 2.3 %    Basophil % 0.4 %    Lymph # 2.44 0.6 - 4.6 x10(3)/mcL    Neut # 7.3 2.1 - 9.2 x10(3)/mcL    Mono # 0.93 0.1 - 1.3 x10(3)/mcL    Eos # 0.25 0 - 0.9 x10(3)/mcL    Baso # 0.04 0 - 0.2 x10(3)/mcL    IG# 0.12 (H) 0 - 0.04 x10(3)/mcL    IG% 1.1 %    NRBC% 0.0 %      US OB Non Rad 14+ Weeks TransAbd, w/Biophysical Profile, w/o NST (xpd)  See Provider Notes for results.     IMPRESSION: Please see provider notes for " interpretation    This procedure was auto-finalized by: Virtual Radiologist      Assessment and Plan  30w3d  premature rupture of membranes.    FHT reassuring this morning.   VSS, afebrile, uterus soft and nontender  Continuous monitoring, biophysical profile every other day (next due on 22)  Advised to report any abdominal pain, vaginal bleeding, abnormal discharge, fever, chills    -VTE prophylaxis  JOHANN's and frequent movement of BLE while resting in bed    I examined the patient around 8:30 a.m. on 2022.  She had no complaints.  There was no uterine tenderness.  Biophysical profile yesterday was  with reassuring fetal heart rate tracing.  Continue current care    TAMRA Richardson as scribe only for Dr. Ovalle who saw the patient this morning.     Components of this note were documented using voice recognition systems; and are subject to errors not corrected at proof reading.  Please contact the author for any clarifications.

## 2022-08-30 NOTE — PROGRESS NOTES
Pt seen this am no complaints   Reports good FM raquel fever chills and pain  +lof  Vss afebrile  Aao3  Abd gravid nt   Ext nt  Nst cat one  [place] quiet    Iup 30-3 cw em

## 2022-08-31 PROCEDURE — 11000001 HC ACUTE MED/SURG PRIVATE ROOM

## 2022-08-31 PROCEDURE — 25000003 PHARM REV CODE 250: Performed by: NURSE PRACTITIONER

## 2022-08-31 PROCEDURE — 72100003 HC LABOR CARE, EA. ADDL. 8 HRS

## 2022-08-31 RX ADMIN — ASPIRIN 81 MG: 81 TABLET, COATED ORAL at 08:08

## 2022-08-31 NOTE — PROGRESS NOTES
Pt seen this am no complaints   +fm denies pain an dbleeding   Vss afebrile  Aao3  Abd gravid nt   Ext nt    Nst cat one  [place] quiet    Iup 30-4 pprom cw em stable

## 2022-08-31 NOTE — PROCEDURE NOTE ADDENDUM
BIOPHYSICAL PROFILE  REPORT    DATE OF ULTRASOUND: 22    INDICATION: PROM     Gestational Age: 30w4d     Movement: 2/2  Tone:  2/2  Fluid:  2/2   Breathin/2    FHR:  133 bpm during ultrasound    ЕЛЕНА: 1.0    Presentation: Cephalic        Impression:  Biophysical profile   wi oligohydramnios        Parmjit Ovalle MD       Components of this note were documented using voice recognition systems; and are subject to errors not corrected at proof reading. Please contact author for any clarifications.

## 2022-08-31 NOTE — PROGRESS NOTES
Ms. Rodriguez is 30 3/7 weeks gestation currently admitted with  premature rupture of membranes. She is s/p Celstone x 2 on 8-15 and 22, magnesium sulfate for neuroprotection, and broad spectrum antibiotic therapy. She has history of PTD in last pregnancy at 28 weeks, and she is on weekly [place] (on ). She is on prophylactic baby aspirin for preeclampsia prophylaxis. She has no complaints this morning. Reports leaking some clear fluid. She reports good fetal movement. She denies any cramping or abdominal pain, vaginal bleeding, abnormal discharge, fever, chills        Review of Systems   Constitutional:  Negative for fever.   Eyes:  Negative for visual disturbance.   Respiratory:  Negative for shortness of breath.    Cardiovascular:  Negative for chest pain.   Gastrointestinal:  Negative for abdominal pain.   Genitourinary:  Negative for vaginal bleeding.   Neurological:  Negative for headaches.   [unfilled]    History reviewed. No pertinent past medical history.   Past Surgical History:   Procedure Laterality Date    ADENOIDECTOMY      MYRINGOTOMY W/ TUBES      TONSILLECTOMY        Social History     Socioeconomic History    Marital status: Single   Tobacco Use    Smoking status: Never    Smokeless tobacco: Never   Substance and Sexual Activity    Alcohol use: Not Currently    Drug use: Never    Sexual activity: Yes      Family History   Problem Relation Age of Onset    Diabetes Mother     COPD Mother     Hypertension Son       Review of patient's allergies indicates:  No Known Allergies   Prior to Admission medications    Medication Sig Start Date End Date Taking? Authorizing Provider   HYDROXYprogesterone (PERLA) 250 mg/mL (1 mL) Oil Inject 250 mg into the muscle every 7 days.    Historical Provider   prenatal vit 10-iron fum-folic (VITAFOL-OB) 65-1 mg Tab Take 1 tab by mouth daily 5/3/22   Alan Ricketts MD            Vitals:    22 1441 22 1902 22 2321 22  "0419   BP: 109/67 104/67 98/61 (!) 99/54   Pulse: 100 94 88 74   Resp: 18 17 16 16   Temp: 98.8 °F (37.1 °C) 98.3 °F (36.8 °C) 98.3 °F (36.8 °C) 98 °F (36.7 °C)   TempSrc:  Oral Oral Oral   SpO2:       Weight:       Height:        No LMP recorded. Patient is pregnant.   HT: 5' 3" (160 cm)  WT: 83.9 kg (185 lb)  BMI: 32.8     Temp:  [96.3 °F (35.7 °C)-98.8 °F (37.1 °C)] 98 °F (36.7 °C)  Pulse:  [] 74  Resp:  [16-18] 16  BP: ()/(54-67) 99/54  Physical Exam  Constitutional:       Appearance: Normal appearance.   Cardiovascular:      Rate and Rhythm: Normal rate.   Pulmonary:      Effort: Pulmonary effort is normal.   Abdominal:      Palpations: Abdomen is soft.   Musculoskeletal:         General: Normal range of motion.   Skin:     General: Skin is warm and dry.   Neurological:      Mental Status: She is alert and oriented to person, place, and time.   Psychiatric:         Mood and Affect: Mood normal.     Recent Results (from the past 48 hour(s))   CBC with Differential    Collection Time: 08/30/22  6:03 AM   Result Value Ref Range    WBC 11.0 4.5 - 11.5 x10(3)/mcL    RBC 3.48 (L) 4.20 - 5.40 x10(6)/mcL    Hgb 11.2 (L) 12.0 - 16.0 gm/dL    Hct 33.8 (L) 37.0 - 47.0 %    MCV 97.1 (H) 80.0 - 94.0 fL    MCH 32.2 (H) 27.0 - 31.0 pg    MCHC 33.1 33.0 - 36.0 mg/dL    RDW 13.2 11.5 - 17.0 %    Platelet 236 130 - 400 x10(3)/mcL    MPV 10.1 7.4 - 10.4 fL    Neut % 65.7 %    Lymph % 22.1 %    Mono % 8.4 %    Eos % 2.3 %    Basophil % 0.4 %    Lymph # 2.44 0.6 - 4.6 x10(3)/mcL    Neut # 7.3 2.1 - 9.2 x10(3)/mcL    Mono # 0.93 0.1 - 1.3 x10(3)/mcL    Eos # 0.25 0 - 0.9 x10(3)/mcL    Baso # 0.04 0 - 0.2 x10(3)/mcL    IG# 0.12 (H) 0 - 0.04 x10(3)/mcL    IG% 1.1 %    NRBC% 0.0 %      US OB Non Rad 14+ Weeks TransAbd, w/Biophysical Profile, w/o NST (xpd)  See Provider Notes for results.     IMPRESSION: Please see provider notes for interpretation    This procedure was auto-finalized by: Virtual " Radiologist        Assessment and Plan  30w4d      premature rupture of membranes.    Soft nontender uterus. Fetal tracing reactive and overall reassuring this am with 135 baseline, 15x15 accelerations, moderate variability, occasional variable decelerations on prolonged monitoring. Delivery not indicated at this time. BPP tomorrow am.  Continuous monitoring    Advised to report any abdominal pain, vaginal bleeding, abnormal discharge, fever, chills    -previous PTD - continue weekly Audra.     -VTE prophylaxis  JOHANN's and frequent movement of BLE while resting in bed     .

## 2022-09-01 PROCEDURE — 63600175 PHARM REV CODE 636 W HCPCS: Mod: JG | Performed by: OBSTETRICS & GYNECOLOGY

## 2022-09-01 PROCEDURE — 11000001 HC ACUTE MED/SURG PRIVATE ROOM

## 2022-09-01 PROCEDURE — 72100003 HC LABOR CARE, EA. ADDL. 8 HRS

## 2022-09-01 PROCEDURE — 25000003 PHARM REV CODE 250: Performed by: NURSE PRACTITIONER

## 2022-09-01 RX ADMIN — HYDROXYPROGESTERONE CAPROATE 250 MG: 250 INJECTION INTRAMUSCULAR at 08:09

## 2022-09-01 RX ADMIN — ASPIRIN 81 MG: 81 TABLET, COATED ORAL at 08:09

## 2022-09-01 NOTE — PROGRESS NOTES
DONNA BARBOZA ACTING AS SCRIBE ONLY FOR DR TAFOYA WHO EVALUATED PATIENT THIS AM    Ms. Rodriguez is 30 5/7 weeks gestation currently admitted with  premature rupture of membranes. She is s/p Celstone x 2 on 8-15 and 22, magnesium sulfate for neuroprotection, and broad spectrum antibiotic therapy. She has history of PTD in last pregnancy at 28 weeks, and she is on weekly [place] (on ). She is on prophylactic baby aspirin for preeclampsia prophylaxis. She has no complaints this morning. Reports leaking some clear fluid. She reports good fetal movement. She denies any cramping or abdominal pain, vaginal bleeding, abnormal discharge, fever, chills    Review of Systems   Constitutional:  Negative for fever.   Eyes:  Negative for visual disturbance.   Respiratory:  Negative for shortness of breath.    Cardiovascular:  Negative for chest pain.   Gastrointestinal:  Negative for abdominal pain.   Genitourinary:  Negative for vaginal bleeding.   Neurological:  Negative for headaches.   [unfilled]    History reviewed. No pertinent past medical history.   Past Surgical History:   Procedure Laterality Date    ADENOIDECTOMY      MYRINGOTOMY W/ TUBES      TONSILLECTOMY        Social History     Socioeconomic History    Marital status: Single   Tobacco Use    Smoking status: Never    Smokeless tobacco: Never   Substance and Sexual Activity    Alcohol use: Not Currently    Drug use: Never    Sexual activity: Yes      Family History   Problem Relation Age of Onset    Diabetes Mother     COPD Mother     Hypertension Son       Review of patient's allergies indicates:  No Known Allergies   Prior to Admission medications    Medication Sig Start Date End Date Taking? Authorizing Provider   HYDROXYprogesterone (PERLA) 250 mg/mL (1 mL) Oil Inject 250 mg into the muscle every 7 days.    Historical Provider   prenatal vit 10-iron fum-folic (VITAFOL-OB) 65-1 mg Tab Take 1 tab by mouth daily 5/3/22   Alan CASSIDY  "MD [facility]            Vitals:    22 1600 22 1900 22 0015 22 0400   BP: (!) 101/59 111/61 (!) 101/57 (!) 112/58   Pulse: 86 89 82 79   Resp: 18 18 18 18   Temp: 98 °F (36.7 °C) 96.1 °F (35.6 °C) 97.9 °F (36.6 °C) 98 °F (36.7 °C)   TempSrc: Oral      SpO2:       Weight:       Height:        No LMP recorded. Patient is pregnant.   HT: 5' 3" (160 cm)  WT: 83.9 kg (185 lb)  BMI: 32.8     Temp:  [96.1 °F (35.6 °C)-98.2 °F (36.8 °C)] 98 °F (36.7 °C)  Pulse:  [79-91] 79  Resp:  [18] 18  BP: (101-112)/(57-68) 112/58  Physical Exam  Constitutional:       Appearance: Normal appearance.   Cardiovascular:      Rate and Rhythm: Normal rate.   Pulmonary:      Effort: Pulmonary effort is normal.   Abdominal:      Palpations: Abdomen is soft.   Musculoskeletal:         General: Normal range of motion.   Skin:     General: Skin is warm and dry.   Neurological:      Mental Status: She is alert and oriented to person, place, and time.   Psychiatric:         Mood and Affect: Mood normal.     No results found for this or any previous visit (from the past 48 hour(s)).   US OB Non Rad 14+ Weeks TransAbd, w/Biophysical Profile, w/o NST (xpd)  See Provider Notes for results.     IMPRESSION: Please see provider notes for interpretation    This procedure was auto-finalized by: Virtual Radiologist        Assessment and Plan  30w5d  premature rupture of membranes. Soft nontender uterus. Fetal tracing reactive and overall reassuring this am with 135 baseline, 15x15 accelerations, moderate variability. Continuous monitoring  BPP done this am.    Expectant management     Advised to report any abdominal pain, vaginal bleeding, abnormal discharge, fever, chills     -previous PTD - continue weekly Audra.     -VTE prophylaxis  JOHANN's and frequent movement of BLE while resting in bed    Patient  has no uterine  tenderness. BPP 8/10, spoke  to nurse Cherry,  and patient already got 17P this morning.     Components of this " note were documented using voice recognition systems; and are subject to errors not corrected at proof reading.  Please contact the author for any clarifications.

## 2022-09-01 NOTE — PROCEDURE NOTE ADDENDUM
BIOPHYSICAL PROFILE  REPORT    DATE OF ULTRASOUND: 2022     INDICATION: PROM    Gestational Age: 30w5d     NST:  2/2 35 baseline, 15x15 accelerations, moderate variability, during morning rounds.   Movement: 2/2  Tone:  2/2  Fluid:  0/2  Breathin/2    FHR:  144 bpm  during ultrasound    ЕЛЕНА: 2.4    Presentation: Cephalic        Impression:  Biophysical profile 8/10         Parmjit Ovalle MD       Components of this note were documented using voice recognition systems; and are subject to errors not corrected at proof reading. Please contact author for any clarifications.

## 2022-09-01 NOTE — PROGRESS NOTES
"Nutrition   Progress Note      Recommendations:  1. Continue regular diet as tolerated  2. RD to monitor po intake and weight changes      Reason for Evaluation:  Length of Stay, Continuous nutrition monitoring    Diagnosis:    1.  premature rupture of membranes (PPROM) with unknown onset of labor        Relevant Medical History:  History reviewed. No pertinent past medical history.      Nutrition Diet History:    Factors affecting nutritional intake: none identified at this time    Food / Adventist / Culture Preferences:  n/a      Nutrition Prescription Ordered:    Current Diet Order: regular    Appetite:  Good (> 75% - 100% po intake)    PO intake: 75 - 100 %      Labs / Medications / Procedures:    Nutrition Related Medications: none nutritionally pertinent    Nutrition Related Labs:  No new labs      Anthropometrics:  Height: 5' 3" (1.6 m)  Admit Weight:  Weight: 83.9 kg (185 lb)  Latest Weight:  83.9 kg (185 lb)    Wt Readings from Last 5 Encounters:   22 83.9 kg (185 lb)   22 83.5 kg (184 lb)   22 83.5 kg (184 lb)   22 80.7 kg (178 lb)   22 81.6 kg (180 lb)     IBW: 52.3 kg  %IBW: 160.4%  UBW: 83.9 kg  %Weight Change: 0%  BMI classification:  BMI not appropriate due to pregnancy      Nutrition Narrative:  : pt reports good appetite, with no n/v/d/c. Pt reported last known weight of 83.9 kg (185 lb) with no recent weight loss.   : Pt reports good appetite, no concerns at this time.   : Pt states good PO intakes, no concerns at this time.    Monitoring and Evaluation:    Nutrition Monitoring and Evaluation:  food and beverage intake and weight change    Nutrition Risk:  Level of Nutrition Risk:  Low  Frequency of Follow up:  Dietitian will f/up within 7 days.            "

## 2022-09-01 NOTE — PROGRESS NOTES
Pt seen this am no complaitns. Reports good fetal movement   Denies s.s of ptl   Vss afebrile  Abd gravid nt  Ext nt    Iup 30-5 pprom   Stable  30w5d  premature rupture of membranes. Soft nontender uterus. Fetal tracing reactive and overall reassuring this am      Expectant management     Advised to report any abdominal pain, vaginal bleeding, abnormal discharge, fever, chills

## 2022-09-02 LAB
BASOPHILS # BLD AUTO: 0.05 X10(3)/MCL (ref 0–0.2)
BASOPHILS NFR BLD AUTO: 0.5 %
EOSINOPHIL # BLD AUTO: 0.23 X10(3)/MCL (ref 0–0.9)
EOSINOPHIL NFR BLD AUTO: 2.2 %
ERYTHROCYTE [DISTWIDTH] IN BLOOD BY AUTOMATED COUNT: 13.1 % (ref 11.5–17)
HCT VFR BLD AUTO: 33.9 % (ref 37–47)
HGB BLD-MCNC: 11.3 GM/DL (ref 12–16)
IMM GRANULOCYTES # BLD AUTO: 0.1 X10(3)/MCL (ref 0–0.04)
IMM GRANULOCYTES NFR BLD AUTO: 0.9 %
LYMPHOCYTES # BLD AUTO: 2.84 X10(3)/MCL (ref 0.6–4.6)
LYMPHOCYTES NFR BLD AUTO: 26.6 %
MCH RBC QN AUTO: 31.9 PG (ref 27–31)
MCHC RBC AUTO-ENTMCNC: 33.3 MG/DL (ref 33–36)
MCV RBC AUTO: 95.8 FL (ref 80–94)
MONOCYTES # BLD AUTO: 0.75 X10(3)/MCL (ref 0.1–1.3)
MONOCYTES NFR BLD AUTO: 7 %
NEUTROPHILS # BLD AUTO: 6.7 X10(3)/MCL (ref 2.1–9.2)
NEUTROPHILS NFR BLD AUTO: 62.8 %
NRBC BLD AUTO-RTO: 0 %
PLATELET # BLD AUTO: 217 X10(3)/MCL (ref 130–400)
PMV BLD AUTO: 9.8 FL (ref 7.4–10.4)
RBC # BLD AUTO: 3.54 X10(6)/MCL (ref 4.2–5.4)
WBC # SPEC AUTO: 10.7 X10(3)/MCL (ref 4.5–11.5)

## 2022-09-02 PROCEDURE — 72100003 HC LABOR CARE, EA. ADDL. 8 HRS

## 2022-09-02 PROCEDURE — 25000003 PHARM REV CODE 250: Performed by: NURSE PRACTITIONER

## 2022-09-02 PROCEDURE — 85025 COMPLETE CBC W/AUTO DIFF WBC: CPT | Performed by: OBSTETRICS & GYNECOLOGY

## 2022-09-02 PROCEDURE — 11000001 HC ACUTE MED/SURG PRIVATE ROOM

## 2022-09-02 PROCEDURE — 36415 COLL VENOUS BLD VENIPUNCTURE: CPT | Performed by: OBSTETRICS & GYNECOLOGY

## 2022-09-02 RX ADMIN — ASPIRIN 81 MG: 81 TABLET, COATED ORAL at 08:09

## 2022-09-03 PROCEDURE — 25000003 PHARM REV CODE 250: Performed by: NURSE PRACTITIONER

## 2022-09-03 PROCEDURE — 72100003 HC LABOR CARE, EA. ADDL. 8 HRS

## 2022-09-03 PROCEDURE — 11000001 HC ACUTE MED/SURG PRIVATE ROOM

## 2022-09-03 RX ADMIN — ASPIRIN 81 MG: 81 TABLET, COATED ORAL at 09:09

## 2022-09-03 NOTE — PROGRESS NOTES
HPI: Ms. Rodriguez is 31 07 weeks gestation currently admitted with  premature rupture of membranes. She is s/p Celstone x 2 on 8-15 and 22, magnesium sulfate for neuroprotection, and broad spectrum antibiotic therapy. She has history of PTD in last pregnancy at 28 weeks, and she is on weekly [place] (on ). She is on prophylactic baby aspirin for preeclampsia prophylaxis. She has no complaints this morning. Reports leaking some clear fluid. She reports good fetal movement. She denies any cramping or abdominal pain, vaginal bleeding, abnormal discharge, fever, chills     Review of Systems   Constitutional:  Negative for fever.   Eyes:  Negative for visual disturbance.   Respiratory:  Negative for shortness of breath.    Cardiovascular:  Negative for chest pain.   Gastrointestinal:  Negative for abdominal pain.   Genitourinary:  Negative for vaginal bleeding.   Neurological:  Negative for headaches.   Review of Systems   [unfilled]  the patient    History reviewed. No pertinent past medical history.   Past Surgical History:   Procedure Laterality Date    ADENOIDECTOMY      MYRINGOTOMY W/ TUBES      TONSILLECTOMY        Social History     Socioeconomic History    Marital status: Single   Tobacco Use    Smoking status: Never    Smokeless tobacco: Never   Substance and Sexual Activity    Alcohol use: Not Currently    Drug use: Never    Sexual activity: Yes      Family History   Problem Relation Age of Onset    Diabetes Mother     COPD Mother     Hypertension Son       Review of patient's allergies indicates:  No Known Allergies   Prior to Admission medications    Medication Sig Start Date End Date Taking? Authorizing Provider   HYDROXYprogesterone (PERLA) 250 mg/mL (1 mL) Oil Inject 250 mg into the muscle every 7 days.    Historical Provider   prenatal vit 10-iron fum-folic (VITAFOL-OB) 65-1 mg Tab Take 1 tab by mouth daily 5/3/22   Alan Ricketts MD            Vitals:    22  "09/03/22 0031 09/03/22 0450 09/03/22 0715   BP: 114/70 111/70 (!) 145/79    Pulse: 93 76 81    Resp: 18 18 18    Temp:  98.1 °F (36.7 °C) 98.2 °F (36.8 °C) 98.2 °F (36.8 °C)   TempSrc:  Oral Oral    SpO2:       Weight:       Height:        No LMP recorded. Patient is pregnant.   HT: 5' 3" (160 cm)  WT: 83.9 kg (185 lb)  BMI: 32.8     Temp:  [96.8 °F (36 °C)-98.6 °F (37 °C)] 98.2 °F (36.8 °C)  Pulse:  [75-93] 81  Resp:  [18] 18  BP: (104-145)/(58-79) 145/79  Physical Exam  Recent Results (from the past 48 hour(s))   CBC with Differential    Collection Time: 09/02/22  5:09 AM   Result Value Ref Range    WBC 10.7 4.5 - 11.5 x10(3)/mcL    RBC 3.54 (L) 4.20 - 5.40 x10(6)/mcL    Hgb 11.3 (L) 12.0 - 16.0 gm/dL    Hct 33.9 (L) 37.0 - 47.0 %    MCV 95.8 (H) 80.0 - 94.0 fL    MCH 31.9 (H) 27.0 - 31.0 pg    MCHC 33.3 33.0 - 36.0 mg/dL    RDW 13.1 11.5 - 17.0 %    Platelet 217 130 - 400 x10(3)/mcL    MPV 9.8 7.4 - 10.4 fL    Neut % 62.8 %    Lymph % 26.6 %    Mono % 7.0 %    Eos % 2.2 %    Basophil % 0.5 %    Lymph # 2.84 0.6 - 4.6 x10(3)/mcL    Neut # 6.7 2.1 - 9.2 x10(3)/mcL    Mono # 0.75 0.1 - 1.3 x10(3)/mcL    Eos # 0.23 0 - 0.9 x10(3)/mcL    Baso # 0.05 0 - 0.2 x10(3)/mcL    IG# 0.10 (H) 0 - 0.04 x10(3)/mcL    IG% 0.9 %    NRBC% 0.0 %      US OB Non Rad 14+ Weeks TransAbd, w/Biophysical Profile, w/o NST (xpd)  See Provider Notes for results.     IMPRESSION: Please see provider notes for interpretation    This procedure was auto-finalized by: Virtual Radiologist            Assessement and Plan  31w0d with premature rupture of membranes no signs or symptoms of infection.  No uterine tenderness on examination.  Will continue current care.      Her blood pressure just not was slightly elevated .  If recurrent will order preeclampsia labs she has no signs or symptoms of preeclampsia otherwise    -VTE prophylaxis: continue same care    Components of this note were documented using voice recognition systems; and are subject to " errors not corrected at proof reading.  Please contact the author for any clarifications.

## 2022-09-04 PROCEDURE — 25000003 PHARM REV CODE 250: Performed by: NURSE PRACTITIONER

## 2022-09-04 PROCEDURE — 72100003 HC LABOR CARE, EA. ADDL. 8 HRS

## 2022-09-04 PROCEDURE — 11000001 HC ACUTE MED/SURG PRIVATE ROOM

## 2022-09-04 RX ADMIN — ASPIRIN 81 MG: 81 TABLET, COATED ORAL at 09:09

## 2022-09-04 NOTE — PROCEDURE NOTE ADDENDUM
BIOPHYSICAL PROFILE  REPORT    DATE OF ULTRASOUND: 2022     INDICATION: PROM    Gestational Age: 31w0d     NST:  2/2 around 135 during morning rounds around 9:30 am, with accelerations, infrequent decelerations on prolonged monitoring  Movement: 2/2  Tone:  2/2  Fluid:  0/2  Breathin/2    FHR:  138 bpm  during ultrasound    ЕЛЕНА: 2.9    Presentation: Cephalic        Impression:  Biophysical profile 8/10         Parmjit Ovalle MD       Components of this note were documented using voice recognition systems; and are subject to errors not corrected at proof reading. Please contact author for any clarifications.

## 2022-09-05 LAB
BASOPHILS # BLD AUTO: 0.03 X10(3)/MCL (ref 0–0.2)
BASOPHILS NFR BLD AUTO: 0.3 %
EOSINOPHIL # BLD AUTO: 0.3 X10(3)/MCL (ref 0–0.9)
EOSINOPHIL NFR BLD AUTO: 3.2 %
ERYTHROCYTE [DISTWIDTH] IN BLOOD BY AUTOMATED COUNT: 13.2 % (ref 11.5–17)
HCT VFR BLD AUTO: 31.7 % (ref 37–47)
HGB BLD-MCNC: 11 GM/DL (ref 12–16)
IMM GRANULOCYTES # BLD AUTO: 0.06 X10(3)/MCL (ref 0–0.04)
IMM GRANULOCYTES NFR BLD AUTO: 0.6 %
LYMPHOCYTES # BLD AUTO: 2.49 X10(3)/MCL (ref 0.6–4.6)
LYMPHOCYTES NFR BLD AUTO: 26.8 %
MCH RBC QN AUTO: 32.4 PG (ref 27–31)
MCHC RBC AUTO-ENTMCNC: 34.7 MG/DL (ref 33–36)
MCV RBC AUTO: 93.5 FL (ref 80–94)
MONOCYTES # BLD AUTO: 0.74 X10(3)/MCL (ref 0.1–1.3)
MONOCYTES NFR BLD AUTO: 8 %
NEUTROPHILS # BLD AUTO: 5.7 X10(3)/MCL (ref 2.1–9.2)
NEUTROPHILS NFR BLD AUTO: 61.1 %
NRBC BLD AUTO-RTO: 0 %
PLATELET # BLD AUTO: 201 X10(3)/MCL (ref 130–400)
PMV BLD AUTO: 9.8 FL (ref 7.4–10.4)
RBC # BLD AUTO: 3.39 X10(6)/MCL (ref 4.2–5.4)
WBC # SPEC AUTO: 9.3 X10(3)/MCL (ref 4.5–11.5)

## 2022-09-05 PROCEDURE — 72100003 HC LABOR CARE, EA. ADDL. 8 HRS

## 2022-09-05 PROCEDURE — 25000003 PHARM REV CODE 250: Performed by: OBSTETRICS & GYNECOLOGY

## 2022-09-05 PROCEDURE — 11000001 HC ACUTE MED/SURG PRIVATE ROOM

## 2022-09-05 PROCEDURE — 85025 COMPLETE CBC W/AUTO DIFF WBC: CPT | Performed by: OBSTETRICS & GYNECOLOGY

## 2022-09-05 PROCEDURE — 36415 COLL VENOUS BLD VENIPUNCTURE: CPT | Performed by: OBSTETRICS & GYNECOLOGY

## 2022-09-05 PROCEDURE — 25000003 PHARM REV CODE 250: Performed by: NURSE PRACTITIONER

## 2022-09-05 RX ORDER — FOLIC ACID 1 MG/1
1 TABLET ORAL DAILY
Status: DISCONTINUED | OUTPATIENT
Start: 2022-09-05 | End: 2022-09-11

## 2022-09-05 RX ORDER — LANOLIN ALCOHOL/MO/W.PET/CERES
1 CREAM (GRAM) TOPICAL 2 TIMES DAILY
Status: DISCONTINUED | OUTPATIENT
Start: 2022-09-05 | End: 2022-09-11

## 2022-09-05 RX ORDER — ASCORBIC ACID 250 MG
250 TABLET ORAL 2 TIMES DAILY
Status: DISCONTINUED | OUTPATIENT
Start: 2022-09-05 | End: 2022-09-11

## 2022-09-05 RX ADMIN — FERROUS SULFATE TAB 325 MG (65 MG ELEMENTAL FE) 1 EACH: 325 (65 FE) TAB at 10:09

## 2022-09-05 RX ADMIN — Medication 250 MG: at 09:09

## 2022-09-05 RX ADMIN — FERROUS SULFATE TAB 325 MG (65 MG ELEMENTAL FE) 1 EACH: 325 (65 FE) TAB at 09:09

## 2022-09-05 RX ADMIN — ASPIRIN 81 MG: 81 TABLET, COATED ORAL at 10:09

## 2022-09-05 RX ADMIN — FOLIC ACID 1 MG: 1 TABLET ORAL at 10:09

## 2022-09-05 NOTE — PROGRESS NOTES
HPI: HPI: Ms. Rodriguez is 31 27 weeks gestation currently admitted with  premature rupture of membranes. She is s/p Celstone x 2 on 8-15 and 22, magnesium sulfate for neuroprotection, and broad spectrum antibiotic therapy. She has history of PTD in last pregnancy at 28 weeks, and she is on weekly [place] (on ). She is on prophylactic baby aspirin for preeclampsia prophylaxis. She has no complaints this morning. Reports leaking some clear fluid. She reports good fetal movement. She denies any cramping or abdominal pain, vaginal bleeding, abnormal discharge, fever, chills     Review of Systems   Constitutional:  Negative for fever.   Eyes:  Negative for visual disturbance.   Respiratory:  Negative for shortness of breath.    Cardiovascular:  Negative for chest pain.   Gastrointestinal:  Negative for abdominal pain.   Genitourinary:  Negative for vaginal bleeding.   Neurological:  Negative for headaches.   Review of Systems         History reviewed. No pertinent past medical history.   Past Surgical History:   Procedure Laterality Date    ADENOIDECTOMY      MYRINGOTOMY W/ TUBES      TONSILLECTOMY        Social History     Socioeconomic History    Marital status: Single   Tobacco Use    Smoking status: Never    Smokeless tobacco: Never   Substance and Sexual Activity    Alcohol use: Not Currently    Drug use: Never    Sexual activity: Yes      Family History   Problem Relation Age of Onset    Diabetes Mother     COPD Mother     Hypertension Son       Review of patient's allergies indicates:  No Known Allergies   Prior to Admission medications    Medication Sig Start Date End Date Taking? Authorizing Provider   HYDROXYprogesterone (PERLA) 250 mg/mL (1 mL) Oil Inject 250 mg into the muscle every 7 days.    Historical Provider   prenatal vit 10-iron fum-folic (VITAFOL-OB) 65-1 mg Tab Take 1 tab by mouth daily 5/3/22   Alan Ricketts MD            Vitals:    22 2125 22 0031 22  "0426 09/05/22 0700   BP: 107/66 108/69 128/65 (!) 100/58   Pulse: 81 73 83 79   Resp: 18      Temp: 98.4 °F (36.9 °C) 97.7 °F (36.5 °C) 97.9 °F (36.6 °C) 96.6 °F (35.9 °C)   TempSrc: Oral Oral Oral    SpO2:       Weight:       Height:        No LMP recorded. Patient is pregnant.   HT: 5' 3" (160 cm)  WT: 83.9 kg (185 lb)  BMI: 32.8     Temp:  [96.6 °F (35.9 °C)-98.6 °F (37 °C)] 96.6 °F (35.9 °C)  Pulse:  [73-83] 79  Resp:  [18] 18  BP: ()/(53-69) 100/58  Physical Exam  Recent Results (from the past 48 hour(s))   CBC with Differential    Collection Time: 09/05/22  5:11 AM   Result Value Ref Range    WBC 9.3 4.5 - 11.5 x10(3)/mcL    RBC 3.39 (L) 4.20 - 5.40 x10(6)/mcL    Hgb 11.0 (L) 12.0 - 16.0 gm/dL    Hct 31.7 (L) 37.0 - 47.0 %    MCV 93.5 80.0 - 94.0 fL    MCH 32.4 (H) 27.0 - 31.0 pg    MCHC 34.7 33.0 - 36.0 mg/dL    RDW 13.2 11.5 - 17.0 %    Platelet 201 130 - 400 x10(3)/mcL    MPV 9.8 7.4 - 10.4 fL    Neut % 61.1 %    Lymph % 26.8 %    Mono % 8.0 %    Eos % 3.2 %    Basophil % 0.3 %    Lymph # 2.49 0.6 - 4.6 x10(3)/mcL    Neut # 5.7 2.1 - 9.2 x10(3)/mcL    Mono # 0.74 0.1 - 1.3 x10(3)/mcL    Eos # 0.30 0 - 0.9 x10(3)/mcL    Baso # 0.03 0 - 0.2 x10(3)/mcL    IG# 0.06 (H) 0 - 0.04 x10(3)/mcL    IG% 0.6 %    NRBC% 0.0 %      US OB Non Rad 14+ Weeks TransAbd, w/Biophysical Profile, w/o NST (xpd)  See Provider Notes for results.     IMPRESSION: Please see provider notes for interpretation    This procedure was auto-finalized by: Virtual Radiologist            Assessement and Plan  31w2d with premature rupture of membranes stable condition biophysical profile 8/10.  No uterine tenderness.  No signs of infection.      -mild anemia will place on hematinic therapy. .     -VTE prophylaxis: continue same care    Components of this note were documented using voice recognition systems; and are subject to errors not corrected at proof reading.  Please contact the author for any clarifications.            "

## 2022-09-05 NOTE — PROCEDURE NOTE ADDENDUM
BIOPHYSICAL PROFILE  REPORT    DATE OF ULTRASOUND: 2022     INDICATION: PROM    Gestational Age: 31w2d     NST:  2/2 BASELINE AT THIS TIME IS AROUND 130 WITH MODERATE VARIABILITY AND ACCELERATIONS  Movement: 2/2  Tone:  2/2  Fluid:  0/2  Breathin/2    FHR:  133 bpm  during ultrasound    ЕЛЕНА: 1.9    Presentation: Cephalic        Impression:  Biophysical profile 8 FOR/10         Parmjit Ovalle MD       Components of this note were documented using voice recognition systems; and are subject to errors not corrected at proof reading. Please contact author for any clarifications.

## 2022-09-06 PROCEDURE — 72100003 HC LABOR CARE, EA. ADDL. 8 HRS

## 2022-09-06 PROCEDURE — 25000003 PHARM REV CODE 250: Performed by: NURSE PRACTITIONER

## 2022-09-06 PROCEDURE — 11000001 HC ACUTE MED/SURG PRIVATE ROOM

## 2022-09-06 PROCEDURE — 25000003 PHARM REV CODE 250: Performed by: OBSTETRICS & GYNECOLOGY

## 2022-09-06 RX ADMIN — FOLIC ACID 1 MG: 1 TABLET ORAL at 09:09

## 2022-09-06 RX ADMIN — FERROUS SULFATE TAB 325 MG (65 MG ELEMENTAL FE) 1 EACH: 325 (65 FE) TAB at 09:09

## 2022-09-06 RX ADMIN — ASPIRIN 81 MG: 81 TABLET, COATED ORAL at 09:09

## 2022-09-06 RX ADMIN — Medication 250 MG: at 09:09

## 2022-09-06 NOTE — PROGRESS NOTES
Pt seen this am no complaints. +fm denies pain denies ctxs  Vss afebrile  Aao3  Abd gravid nt   Ext nt    Nst cat one  [place] quiet    Iup 31-3 cw pprom cw em

## 2022-09-06 NOTE — PROGRESS NOTES
Ms. Rodriguez is 31 37 weeks gestation currently admitted with  premature rupture of membranes. She is s/p Celstone x 2 on 8-15 and 22, magnesium sulfate for neuroprotection, and broad spectrum antibiotic therapy. She has history of PTD in last pregnancy at 28 weeks, and she is on weekly [place] (on ). She is on prophylactic baby aspirin for preeclampsia prophylaxis. She is also now on oral hematinic therapy for mild anemia. She reports good fetal movement. She has no complaints, and denies any cramping or abdominal pain, vaginal bleeding, abnormal discharge, fever, chills    Review of Systems   Constitutional: Negative.    Eyes: Negative.    Respiratory: Negative.     Cardiovascular: Negative.    Gastrointestinal: Negative.    Endocrine: Negative.    Genitourinary: Negative.    Musculoskeletal: Negative.    Skin: Negative.    Allergic/Immunologic: Negative.    Neurological: Negative.    Hematological: Negative.    Psychiatric/Behavioral: Negative.         History reviewed. No pertinent past medical history.   Past Surgical History:   Procedure Laterality Date    ADENOIDECTOMY      MYRINGOTOMY W/ TUBES      TONSILLECTOMY        Social History     Socioeconomic History    Marital status: Single   Tobacco Use    Smoking status: Never    Smokeless tobacco: Never   Substance and Sexual Activity    Alcohol use: Not Currently    Drug use: Never    Sexual activity: Yes      Family History   Problem Relation Age of Onset    Diabetes Mother     COPD Mother     Hypertension Son       Review of patient's allergies indicates:  No Known Allergies   Prior to Admission medications    Medication Sig Start Date End Date Taking? Authorizing Provider   HYDROXYprogesterone (PERLA) 250 mg/mL (1 mL) Oil Inject 250 mg into the muscle every 7 days.    Historical Provider   prenatal vit 10-iron fum-folic (VITAFOL-OB) 65-1 mg Tab Take 1 tab by mouth daily 5/3/22   Alan Ricketts MD            Vitals:    22  "09/06/22 0000 09/06/22 0400 09/06/22 0745   BP: 121/72 108/68 (!) 99/57 (!) 99/54   Pulse: 86 76 91 78   Resp: 18 18 18 18   Temp: 98.2 °F (36.8 °C) 98.1 °F (36.7 °C) 98.1 °F (36.7 °C) 98.3 °F (36.8 °C)   TempSrc:    Oral   SpO2:       Weight:       Height:        No LMP recorded. Patient is pregnant.   HT: 5' 3" (160 cm)  WT: 83.9 kg (185 lb)  BMI: 32.8     Temp:  [97.1 °F (36.2 °C)-98.3 °F (36.8 °C)] 98.3 °F (36.8 °C)  Pulse:  [76-91] 78  Resp:  [18] 18  BP: ()/(54-72) 99/54  Physical Exam  Constitutional:       General: She is not in acute distress.  Cardiovascular:      Rate and Rhythm: Normal rate and regular rhythm.      Pulses: Normal pulses.      Heart sounds: Normal heart sounds.   Pulmonary:      Effort: Pulmonary effort is normal. No respiratory distress.      Breath sounds: Normal breath sounds.   Abdominal:      Palpations: Abdomen is soft.      Tenderness: There is no abdominal tenderness.      Comments: Gravid uterus, no uterine tenderness   Musculoskeletal:      Right lower leg: No edema.      Left lower leg: No edema.   Skin:     General: Skin is warm and dry.      Capillary Refill: Capillary refill takes less than 2 seconds.   Neurological:      General: No focal deficit present.      Mental Status: She is alert and oriented to person, place, and time.   Psychiatric:         Mood and Affect: Mood normal.         Behavior: Behavior normal.         Thought Content: Thought content normal.     Recent Results (from the past 48 hour(s))   CBC with Differential    Collection Time: 09/05/22  5:11 AM   Result Value Ref Range    WBC 9.3 4.5 - 11.5 x10(3)/mcL    RBC 3.39 (L) 4.20 - 5.40 x10(6)/mcL    Hgb 11.0 (L) 12.0 - 16.0 gm/dL    Hct 31.7 (L) 37.0 - 47.0 %    MCV 93.5 80.0 - 94.0 fL    MCH 32.4 (H) 27.0 - 31.0 pg    MCHC 34.7 33.0 - 36.0 mg/dL    RDW 13.2 11.5 - 17.0 %    Platelet 201 130 - 400 x10(3)/mcL    MPV 9.8 7.4 - 10.4 fL    Neut % 61.1 %    Lymph % 26.8 %    Mono % 8.0 %    Eos % 3.2 %    " Basophil % 0.3 %    Lymph # 2.49 0.6 - 4.6 x10(3)/mcL    Neut # 5.7 2.1 - 9.2 x10(3)/mcL    Mono # 0.74 0.1 - 1.3 x10(3)/mcL    Eos # 0.30 0 - 0.9 x10(3)/mcL    Baso # 0.03 0 - 0.2 x10(3)/mcL    IG# 0.06 (H) 0 - 0.04 x10(3)/mcL    IG% 0.6 %    NRBC% 0.0 %      US OB Non Rad 14+ Weeks TransAbd, w/Biophysical Profile, w/o NST (xpd)  See Provider Notes for results.     IMPRESSION: Please see provider notes for interpretation    This procedure was auto-finalized by: Virtual Radiologist        Assessment and Plan  31w3d  premature rupture of membranes  FHT currently 130 baseline, moderate variability, 15x15 accels, rare small variable decels, rare contractions overnight, none at this time.   VSS, afebrile  Continue current management  Advised to report any abdominal pain or cramping, vaginal bleeding, abnormal discharge, fever, chills    -Mild anemia  Continue oral hematinic therapy    -VTE prophylaxis  Continue current management

## 2022-09-07 PROCEDURE — 72100003 HC LABOR CARE, EA. ADDL. 8 HRS

## 2022-09-07 PROCEDURE — 25000003 PHARM REV CODE 250: Performed by: OBSTETRICS & GYNECOLOGY

## 2022-09-07 PROCEDURE — 11000001 HC ACUTE MED/SURG PRIVATE ROOM

## 2022-09-07 RX ADMIN — FERROUS SULFATE TAB 325 MG (65 MG ELEMENTAL FE) 1 EACH: 325 (65 FE) TAB at 08:09

## 2022-09-07 RX ADMIN — Medication 250 MG: at 08:09

## 2022-09-07 NOTE — PROGRESS NOTES
Pt seen this am reports good fetal movement denies vb denies fever chills and pain.   Vss afebrile  Aao3  Abd nt nd gravid  Ext nt    Nst cat one  [place] quiet      Iup 31-4 pprom stable  Cw em

## 2022-09-07 NOTE — PROCEDURE NOTE ADDENDUM
BIOPHYSICAL PROFILE  REPORT    DATE OF ULTRASOUND: 2022     INDICATION: PROM    Gestational Age: 31w4d     NST:  2/2  Movement: 2/2  Tone:  2/2  Fluid:  0/2  Breathin/2    FHR:  135 bpm  during ultrasound    ЕЛЕНА:  1.9    Presentation: Cephalic        Impression:  Biophysical profile 8/10         Parmjit Ovalle MD       Components of this note were documented using voice recognition systems; and are subject to errors not corrected at proof reading. Please contact author for any clarifications.

## 2022-09-07 NOTE — PROGRESS NOTES
Ms. Rodriguez is 31 4//7 weeks gestation currently admitted with  premature rupture of membranes. She is s/p Celstone x 2 on 8-15 and 22, magnesium sulfate for neuroprotection, and broad spectrum antibiotic therapy. She has history of PTD in last pregnancy at 28 weeks, and she is on weekly [place] (on ). She is on prophylactic baby aspirin for preeclampsia prophylaxis. She is also now on oral hematinic therapy for mild anemia. She reports good fetal movement. She has no complaints, and denies any cramping or abdominal pain, vaginal bleeding, abnormal discharge, fever, chills    Review of Systems   Constitutional:  Negative for fever.   Eyes:  Negative for visual disturbance.   Respiratory:  Negative for shortness of breath.    Cardiovascular:  Negative for chest pain.   Gastrointestinal:  Negative for abdominal pain.   Genitourinary:  Negative for vaginal bleeding.   Neurological:  Negative for headaches.   [unfilled]    History reviewed. No pertinent past medical history.   Past Surgical History:   Procedure Laterality Date    ADENOIDECTOMY      MYRINGOTOMY W/ TUBES      TONSILLECTOMY        Social History     Socioeconomic History    Marital status: Single   Tobacco Use    Smoking status: Never    Smokeless tobacco: Never   Substance and Sexual Activity    Alcohol use: Not Currently    Drug use: Never    Sexual activity: Yes      Family History   Problem Relation Age of Onset    Diabetes Mother     COPD Mother     Hypertension Son       Review of patient's allergies indicates:  No Known Allergies   Prior to Admission medications    Medication Sig Start Date End Date Taking? Authorizing Provider   HYDROXYprogesterone (PERLA) 250 mg/mL (1 mL) Oil Inject 250 mg into the muscle every 7 days.    Historical Provider   prenatal vit 10-iron fum-folic (VITAFOL-OB) 65-1 mg Tab Take 1 tab by mouth daily 5/3/22   Alan Ricketts MD            Vitals:    22 1950 22 0151 22 0555  "22 0727   BP: 110/71 (!) 105/59 112/64 105/62   Pulse: 87 72 90 76   Resp: 18 18 18 18   Temp: 98.2 °F (36.8 °C) 98.2 °F (36.8 °C) 97.9 °F (36.6 °C) 98.1 °F (36.7 °C)   TempSrc: Oral Oral Oral    SpO2:       Weight:       Height:        No LMP recorded. Patient is pregnant.   HT: 5' 3" (160 cm)  WT: 83.9 kg (185 lb)  BMI: 32.8     Temp:  [97.9 °F (36.6 °C)-98.8 °F (37.1 °C)] 98.1 °F (36.7 °C)  Pulse:  [72-92] 76  Resp:  [18] 18  BP: (105-112)/(59-71) 105/62  Physical Exam  Constitutional:       Appearance: Normal appearance.   Cardiovascular:      Rate and Rhythm: Normal rate.   Pulmonary:      Effort: Pulmonary effort is normal.   Abdominal:      Palpations: Abdomen is soft.   Musculoskeletal:         General: Normal range of motion.   Skin:     General: Skin is warm and dry.   Neurological:      Mental Status: She is alert and oriented to person, place, and time.   Psychiatric:         Mood and Affect: Mood normal.     No results found for this or any previous visit (from the past 48 hour(s)).   US OB Non Rad 14+ Weeks TransAbd, w/Biophysical Profile, w/o NST (xpd)  See Provider Notes for results.     IMPRESSION: Please see provider notes for interpretation    This procedure was auto-finalized by: Virtual Radiologist        Assessment and Plan  31w4d   premature rupture of membranes. Nontender uterus. Fetal tracing currently 130 baseline, moderate variability, 15x15 accelerations. Preliminary read on BPP this am with ЕЛЕНА 1.87cm and BPP 8/10    Continue current management  Advised to report any abdominal pain or cramping, vaginal bleeding, abnormal discharge, fever, chills     -Mild anemia  Continue oral hematinic therapy    -previous PTD - continue weekly [place]     -VTE prophylaxis  Continue current management    Examined the patient this morning during rounds with NP.  No uterine  tenderness. continue the hematinic therapy.  Patient is stable with no need for repeat CBCs every 3 days at this " time    Components of this note were documented using voice recognition systems; and are subject to errors not corrected at proof reading.  Please contact the author for any clarifications.

## 2022-09-08 PROCEDURE — 72100003 HC LABOR CARE, EA. ADDL. 8 HRS

## 2022-09-08 PROCEDURE — 25000003 PHARM REV CODE 250: Performed by: OBSTETRICS & GYNECOLOGY

## 2022-09-08 PROCEDURE — 63600175 PHARM REV CODE 636 W HCPCS: Mod: JG | Performed by: OBSTETRICS & GYNECOLOGY

## 2022-09-08 PROCEDURE — 25000003 PHARM REV CODE 250: Performed by: NURSE PRACTITIONER

## 2022-09-08 PROCEDURE — 11000001 HC ACUTE MED/SURG PRIVATE ROOM

## 2022-09-08 RX ADMIN — FERROUS SULFATE TAB 325 MG (65 MG ELEMENTAL FE) 1 EACH: 325 (65 FE) TAB at 09:09

## 2022-09-08 RX ADMIN — Medication 250 MG: at 09:09

## 2022-09-08 RX ADMIN — FOLIC ACID 1 MG: 1 TABLET ORAL at 09:09

## 2022-09-08 RX ADMIN — FERROUS SULFATE TAB 325 MG (65 MG ELEMENTAL FE) 1 EACH: 325 (65 FE) TAB at 08:09

## 2022-09-08 RX ADMIN — HYDROXYPROGESTERONE CAPROATE 250 MG: 250 INJECTION INTRAMUSCULAR at 09:09

## 2022-09-08 RX ADMIN — Medication 250 MG: at 08:09

## 2022-09-08 RX ADMIN — ASPIRIN 81 MG: 81 TABLET, COATED ORAL at 09:09

## 2022-09-08 NOTE — PROGRESS NOTES
"Nutrition   Progress Note      Recommendations:  1. Continue regular diet as tolerated  2. RD to monitor po intake and weight changes      Reason for Evaluation:  Length of Stay, Continuous nutrition monitoring    Diagnosis:    1.  premature rupture of membranes (PPROM) with unknown onset of labor        Relevant Medical History:  History reviewed. No pertinent past medical history.      Nutrition Diet History:    Factors affecting nutritional intake: none identified at this time    Food / Zoroastrianism / Culture Preferences:  n/a      Nutrition Prescription Ordered:    Current Diet Order: regular    Appetite:  Good (> 75% - 100% po intake)    PO intake: 75 - 100 %      Labs / Medications / Procedures:    Nutrition Related Medications: none nutritionally pertinent    Nutrition Related Labs:  No new labs      Anthropometrics:  Height: 5' 3" (1.6 m)  Admit Weight:  Weight: 83.9 kg (185 lb)  Latest Weight:  83.9 kg (185 lb)    Wt Readings from Last 5 Encounters:   22 83.9 kg (185 lb)   22 83.5 kg (184 lb)   22 83.5 kg (184 lb)   22 80.7 kg (178 lb)   22 81.6 kg (180 lb)     IBW: 52.3 kg  %IBW: 160.4%  UBW: 83.9 kg  %Weight Change: 0%  BMI classification:  BMI not appropriate due to pregnancy      Nutrition Narrative:  : pt reports good appetite, with no n/v/d/c. Pt reported last known weight of 83.9 kg (185 lb) with no recent weight loss.   : Pt reports good appetite, no concerns at this time.   : Pt states good PO intakes, no concerns at this time.  : Pt states good PO intakes.     Monitoring and Evaluation:    Nutrition Monitoring and Evaluation:  food and beverage intake and weight change    Nutrition Risk:  Level of Nutrition Risk:  Low  Frequency of Follow up:  Dietitian will f/up within 7 days.            "

## 2022-09-08 NOTE — PROCEDURE NOTE ADDENDUM
Fetal nonstress test    Dated there 09/08/2022    Indication is premature rupture of membranes.    Review of fetal heart rate tracing during morning rounds showed a baseline heart rate around 130 with moderate variability and accelerations about 15 by 15 beats per minute    Impression is reactive fetal nonstress test

## 2022-09-08 NOTE — PROGRESS NOTES
Pt seen this am no complaints  Reports +FM denies pain denies bleeding  Vss afebrile  Aao3  Abd gravid nt   Ext nt    Nst cat one  [place] quiet    Iup 31-5 pprom cw em

## 2022-09-08 NOTE — PROGRESS NOTES
Kvng Rodriguez is 31 5/7 weeks gestation currently admitted with  premature rupture of membranes. She is s/p Celstone x 2 on 8-15 and 22, magnesium sulfate for neuroprotection, and broad spectrum antibiotic therapy. She has history of PTD in last pregnancy at 28 weeks, and she is on weekly [place] (on ). She is on prophylactic baby aspirin for preeclampsia prophylaxis. She is on oral hematinic therapy for mild anemia. She reports good fetal movement. She has no complaints, and denies any cramping or abdominal pain, vaginal bleeding, abnormal discharge, fever, chills      Review of Systems   Constitutional: Negative.    Eyes: Negative.    Respiratory: Negative.     Cardiovascular: Negative.    Gastrointestinal: Negative.    Endocrine: Negative.    Genitourinary: Negative.    Musculoskeletal: Negative.    Skin: Negative.    Allergic/Immunologic: Negative.    Neurological: Negative.    Hematological: Negative.    Psychiatric/Behavioral: Negative.           History reviewed. No pertinent past medical history.   Past Surgical History:   Procedure Laterality Date    ADENOIDECTOMY      MYRINGOTOMY W/ TUBES      TONSILLECTOMY        Social History     Socioeconomic History    Marital status: Single   Tobacco Use    Smoking status: Never    Smokeless tobacco: Never   Substance and Sexual Activity    Alcohol use: Not Currently    Drug use: Never    Sexual activity: Yes      Family History   Problem Relation Age of Onset    Diabetes Mother     COPD Mother     Hypertension Son       Review of patient's allergies indicates:  No Known Allergies   Prior to Admission medications    Medication Sig Start Date End Date Taking? Authorizing Provider   HYDROXYprogesterone (PERLA) 250 mg/mL (1 mL) Oil Inject 250 mg into the muscle every 7 days.    Historical Provider   prenatal vit 10-iron fum-folic (VITAFOL-OB) 65-1 mg Tab Take 1 tab by mouth daily 5/3/22   Alan Ricketts MD            Vitals:    22 1101  "09/07/22 1911 09/07/22 2230 09/08/22 0504   BP: 108/72 107/68 107/61 101/61   Pulse: 82 85 80 90   Resp: 18 18 18 18   Temp: 98.1 °F (36.7 °C) 98.2 °F (36.8 °C) 98.4 °F (36.9 °C) 98.2 °F (36.8 °C)   TempSrc:  Oral  Oral   SpO2:       Weight:       Height:        No LMP recorded. Patient is pregnant.   HT: 5' 3" (160 cm)  WT: 83.9 kg (185 lb)  BMI: 32.8     Temp:  [98.1 °F (36.7 °C)-98.4 °F (36.9 °C)] 98.2 °F (36.8 °C)  Pulse:  [80-90] 90  Resp:  [18] 18  BP: (101-108)/(61-72) 101/61  Physical Exam  Constitutional:       General: She is not in acute distress.  Pulmonary:      Effort: Pulmonary effort is normal. No respiratory distress.   Abdominal:      Palpations: Abdomen is soft.      Tenderness: There is no abdominal tenderness.      Comments: Gravid uterus, no uterine tenderness   Musculoskeletal:      Right lower leg: No edema.      Left lower leg: No edema.   Skin:     General: Skin is warm and dry.   Neurological:      General: No focal deficit present.      Mental Status: She is alert and oriented to person, place, and time.   Psychiatric:         Mood and Affect: Mood normal.         Behavior: Behavior normal.         Thought Content: Thought content normal.     No results found for this or any previous visit (from the past 48 hour(s)).   US OB Non Rad 14+ Weeks TransAbd, w/Biophysical Profile, w/o NST (xpd)  See Provider Notes for results.     IMPRESSION: Please see provider notes for interpretation    This procedure was auto-finalized by: Virtual Radiologist            Assessment and Plan    31w5d   PPROM  FHT currently 130 baseline, moderate variability, 15x15 accels, rare variable decels, no ctx  VSS, afebrile  Continue current management  Next biophysical profile due tomorrow 9-9  Advised to report any abdominal pain or cramping, vaginal bleeding, abnormal discharge, fever, chills     -Mild anemia  Continue oral hematinic therapy     -VTE prophylaxis  Continue current management    I examined the " patient around 8:45 a.m..  Rounded with NP.  There is no uterine tenderness.  Supposedly active.    Components of this note were documented using voice recognition systems; and are subject to errors not corrected at proof reading.  Please contact the author for any clarifications.

## 2022-09-09 PROCEDURE — 25000003 PHARM REV CODE 250: Performed by: OBSTETRICS & GYNECOLOGY

## 2022-09-09 PROCEDURE — 25000003 PHARM REV CODE 250: Performed by: NURSE PRACTITIONER

## 2022-09-09 PROCEDURE — 11000001 HC ACUTE MED/SURG PRIVATE ROOM

## 2022-09-09 PROCEDURE — 72100003 HC LABOR CARE, EA. ADDL. 8 HRS

## 2022-09-09 RX ADMIN — FERROUS SULFATE TAB 325 MG (65 MG ELEMENTAL FE) 1 EACH: 325 (65 FE) TAB at 09:09

## 2022-09-09 RX ADMIN — Medication 250 MG: at 08:09

## 2022-09-09 RX ADMIN — ASPIRIN 81 MG: 81 TABLET, COATED ORAL at 09:09

## 2022-09-09 RX ADMIN — FERROUS SULFATE TAB 325 MG (65 MG ELEMENTAL FE) 1 EACH: 325 (65 FE) TAB at 08:09

## 2022-09-09 RX ADMIN — FOLIC ACID 1 MG: 1 TABLET ORAL at 09:09

## 2022-09-09 RX ADMIN — Medication 250 MG: at 09:09

## 2022-09-09 NOTE — PROGRESS NOTES
Ms. Rodriguez is 31 6/7 weeks gestation currently admitted with  premature rupture of membranes. She is s/p Celstone x 2 on 8-15 and 22, magnesium sulfate for neuroprotection, and broad spectrum antibiotic therapy. She has history of PTD in last pregnancy at 28 weeks, and she is on weekly [place] (on ). She is on prophylactic baby aspirin for preeclampsia prophylaxis. She is on oral hematinic therapy for mild anemia. She reports good fetal movement. She has no complaints, and denies any cramping or abdominal pain, vaginal bleeding, abnormal discharge, fever, chills    Review of Systems   Constitutional: Negative.    Eyes: Negative.    Respiratory: Negative.     Cardiovascular: Negative.    Gastrointestinal: Negative.    Endocrine: Negative.    Genitourinary: Negative.    Musculoskeletal: Negative.    Skin: Negative.    Allergic/Immunologic: Negative.    Neurological: Negative.    Hematological: Negative.    Psychiatric/Behavioral: Negative.         History reviewed. No pertinent past medical history.   Past Surgical History:   Procedure Laterality Date    ADENOIDECTOMY      MYRINGOTOMY W/ TUBES      TONSILLECTOMY        Social History     Socioeconomic History    Marital status: Single   Tobacco Use    Smoking status: Never    Smokeless tobacco: Never   Substance and Sexual Activity    Alcohol use: Not Currently    Drug use: Never    Sexual activity: Yes      Family History   Problem Relation Age of Onset    Diabetes Mother     COPD Mother     Hypertension Son       Review of patient's allergies indicates:  No Known Allergies   Prior to Admission medications    Medication Sig Start Date End Date Taking? Authorizing Provider   HYDROXYprogesterone (PERLA) 250 mg/mL (1 mL) Oil Inject 250 mg into the muscle every 7 days.    Historical Provider   prenatal vit 10-iron fum-folic (VITAFOL-OB) 65-1 mg Tab Take 1 tab by mouth daily 5/3/22   Alan Ricketts MD            Vitals:    22 1550 22  "1904 09/08/22 2300 09/09/22 0300   BP: 109/61 114/70 108/64 107/62   Pulse: 84 84 84 75   Resp:  18 16 18   Temp: 98.2 °F (36.8 °C) 98.2 °F (36.8 °C) 98.8 °F (37.1 °C) 98.1 °F (36.7 °C)   TempSrc:  Oral     SpO2:       Weight:       Height:        No LMP recorded. Patient is pregnant.   HT: 5' 3" (160 cm)  WT: 83.9 kg (185 lb)  BMI: 32.8     Temp:  [98.1 °F (36.7 °C)-98.8 °F (37.1 °C)] 98.1 °F (36.7 °C)  Pulse:  [75-84] 75  Resp:  [16-18] 18  BP: (107-114)/(61-74) 107/62  Physical Exam  Constitutional:       General: She is not in acute distress.  Cardiovascular:      Rate and Rhythm: Normal rate and regular rhythm.      Pulses: Normal pulses.      Heart sounds: Normal heart sounds.   Pulmonary:      Effort: Pulmonary effort is normal.      Breath sounds: Normal breath sounds.   Abdominal:      Palpations: Abdomen is soft.      Tenderness: There is no abdominal tenderness.      Comments: Gravid uterus, no uterine tenderness   Musculoskeletal:      Right lower leg: No edema.      Left lower leg: No edema.   Skin:     General: Skin is warm and dry.      Capillary Refill: Capillary refill takes less than 2 seconds.   Neurological:      General: No focal deficit present.      Mental Status: She is alert and oriented to person, place, and time.   Psychiatric:         Mood and Affect: Mood normal.         Behavior: Behavior normal.         Thought Content: Thought content normal.     No results found for this or any previous visit (from the past 48 hour(s)).   US OB Non Rad 14+ Weeks TransAbd, w/Biophysical Profile, w/o NST (xpd)  See Provider Notes for results.     IMPRESSION: Please see provider notes for interpretation    This procedure was auto-finalized by: Virtual Radiologist        Assessment and Plan  31w6d   PPROM  FHT currently 130 baseline, moderate variability, 15x15 accels, isolated late decel overnight with no recurrence, rare contractions  VSS, afebrile, uterus soft and nontender  Continue current " management  Biophysical profile today (prelim 6/8 with ЕЛЕНА 2.7cm, pending formal read by Dr. Ovalle)  Advised to report any abdominal pain or cramping, vaginal bleeding, abnormal discharge, fever, chills     -Mild anemia  Continue oral hematinic therapy     -VTE prophylaxis  Continue current management      I examined the patient around 8:00 a.m. on 09/09/2022.  She had no complaints.  She was leaking slight fluid.  There was no uterine tenderness.  Biophysical profile yesterday was 8/10.  Components of this note were documented using voice recognition systems; and are subject to errors not corrected at proof reading.  Please contact the author for any clarifications.

## 2022-09-09 NOTE — PROCEDURE NOTE ADDENDUM
BIOPHYSICAL PROFILE  REPORT    DATE OF ULTRASOUND: 2022     INDICATION: PROM    Gestational Age: 31w6d     NST:  2/2 baseline 130 during morning rounds with accelerations, infrequent rare deceleration  Movement: 2/2  Tone:  2/2  Fluid:  0/2  Breathin/2    FHR:  136 bpm  during ultrasound    ЕЛЕНА: 2.7    Presentation: Cephalic        Impression:  Biophysical profile 8/10         Parmjit Ovalle MD       Components of this note were documented using voice recognition systems; and are subject to errors not corrected at proof reading. Please contact author for any clarifications.

## 2022-09-10 LAB — BACTERIA UR CULT: ABNORMAL

## 2022-09-10 PROCEDURE — 63600175 PHARM REV CODE 636 W HCPCS: Performed by: OBSTETRICS & GYNECOLOGY

## 2022-09-10 PROCEDURE — 11000001 HC ACUTE MED/SURG PRIVATE ROOM

## 2022-09-10 PROCEDURE — 25000003 PHARM REV CODE 250: Performed by: OBSTETRICS & GYNECOLOGY

## 2022-09-10 PROCEDURE — 25000003 PHARM REV CODE 250: Performed by: NURSE PRACTITIONER

## 2022-09-10 PROCEDURE — 72100003 HC LABOR CARE, EA. ADDL. 8 HRS

## 2022-09-10 RX ORDER — SODIUM CHLORIDE, SODIUM LACTATE, POTASSIUM CHLORIDE, CALCIUM CHLORIDE 600; 310; 30; 20 MG/100ML; MG/100ML; MG/100ML; MG/100ML
INJECTION, SOLUTION INTRAVENOUS CONTINUOUS
Status: DISCONTINUED | OUTPATIENT
Start: 2022-09-10 | End: 2022-09-11

## 2022-09-10 RX ADMIN — Medication 250 MG: at 12:09

## 2022-09-10 RX ADMIN — FOLIC ACID 1 MG: 1 TABLET ORAL at 09:09

## 2022-09-10 RX ADMIN — Medication 250 MG: at 08:09

## 2022-09-10 RX ADMIN — FERROUS SULFATE TAB 325 MG (65 MG ELEMENTAL FE) 1 EACH: 325 (65 FE) TAB at 08:09

## 2022-09-10 RX ADMIN — SODIUM CHLORIDE, POTASSIUM CHLORIDE, SODIUM LACTATE AND CALCIUM CHLORIDE 500 ML: 600; 310; 30; 20 INJECTION, SOLUTION INTRAVENOUS at 09:09

## 2022-09-10 RX ADMIN — ASPIRIN 81 MG: 81 TABLET, COATED ORAL at 09:09

## 2022-09-10 RX ADMIN — FERROUS SULFATE TAB 325 MG (65 MG ELEMENTAL FE) 1 EACH: 325 (65 FE) TAB at 09:09

## 2022-09-10 NOTE — PROGRESS NOTES
Ms. Rodriguez is 32 0/7 weeks gestation currently admitted with  premature rupture of membranes. She is s/p Celstone x 2 on 8-15 and 22, magnesium sulfate for neuroprotection, and broad spectrum antibiotic therapy. She has history of PTD in last pregnancy at 28 weeks, and she is on weekly [place] (on ). She is on prophylactic baby aspirin for preeclampsia prophylaxis. She is on oral hematinic therapy for mild anemia. She reports good fetal movement. She reports did have some spotting yesterday mid day, now resolved. Otherwise, she has no complaints at this time and denies any cramping or abdominal pain, current vaginal bleeding, abnormal discharge, fever, chills    Review of Systems   Constitutional: Negative.    Eyes: Negative.    Respiratory: Negative.     Cardiovascular: Negative.    Gastrointestinal: Negative.    Endocrine: Negative.    Genitourinary: Negative.    Musculoskeletal: Negative.    Skin: Negative.    Allergic/Immunologic: Negative.    Neurological: Negative.    Hematological: Negative.    Psychiatric/Behavioral: Negative.         History reviewed. No pertinent past medical history.   Past Surgical History:   Procedure Laterality Date    ADENOIDECTOMY      MYRINGOTOMY W/ TUBES      TONSILLECTOMY        Social History     Socioeconomic History    Marital status: Single   Tobacco Use    Smoking status: Never    Smokeless tobacco: Never   Substance and Sexual Activity    Alcohol use: Not Currently    Drug use: Never    Sexual activity: Yes      Family History   Problem Relation Age of Onset    Diabetes Mother     COPD Mother     Hypertension Son       Review of patient's allergies indicates:  No Known Allergies   Prior to Admission medications    Medication Sig Start Date End Date Taking? Authorizing Provider   HYDROXYprogesterone (PERLA) 250 mg/mL (1 mL) Oil Inject 250 mg into the muscle every 7 days.    Historical Provider   prenatal vit 10-iron fum-folic (VITAFOL-OB) 65-1 mg  "Tab Take 1 tab by mouth daily 5/3/22   Alan Ricketts MD            Vitals:    09/09/22 2016 09/10/22 0046 09/10/22 0449 09/10/22 0825   BP: 110/65 108/69 (!) 99/58 99/61   Pulse: 87 86 90 92   Resp: 16   18   Temp: 98.7 °F (37.1 °C)   98.2 °F (36.8 °C)   TempSrc:       SpO2:       Weight:       Height:        No LMP recorded. Patient is pregnant.   HT: 5' 3" (160 cm)  WT: 83.9 kg (185 lb)  BMI: 32.8     Temp:  [98.2 °F (36.8 °C)-98.7 °F (37.1 °C)] 98.2 °F (36.8 °C)  Pulse:  [] 92  Resp:  [16-18] 18  BP: ()/(58-69) 99/61  Physical Exam  Constitutional:       General: She is not in acute distress.  HENT:      Mouth/Throat:      Mouth: Mucous membranes are moist.   Eyes:      Conjunctiva/sclera: Conjunctivae normal.   Cardiovascular:      Rate and Rhythm: Normal rate and regular rhythm.      Pulses: Normal pulses.      Heart sounds: Normal heart sounds.   Pulmonary:      Effort: Pulmonary effort is normal. No respiratory distress.      Breath sounds: Normal breath sounds.   Abdominal:      General: Abdomen is flat.      Palpations: Abdomen is soft.   Musculoskeletal:      Right lower leg: No edema.      Left lower leg: No edema.   Skin:     General: Skin is warm and dry.      Capillary Refill: Capillary refill takes less than 2 seconds.   Neurological:      General: No focal deficit present.      Mental Status: She is alert and oriented to person, place, and time.      Deep Tendon Reflexes: Reflexes normal.   Psychiatric:         Mood and Affect: Mood normal.         Behavior: Behavior normal.         Thought Content: Thought content normal.     No results found for this or any previous visit (from the past 48 hour(s)).   US OB Non Rad 14+ Weeks TransAbd, w/Biophysical Profile, w/o NST (xpd)  See Provider Notes for results.     IMPRESSION: Please see provider notes for interpretation    This procedure was auto-finalized by: Virtual Radiologist        Assessment and Plan  32w0d   PPROM  FHT currently 130 " baseline, moderate variability, 15x15 accels, no decels, rare contractions  VSS, afebrile, uterus soft and nontender  Continue current management  Biophysical profile every other day (next due tomorrow, and will repeat the EFW)  Bleeding now resolved. However, discussed that with vaginal bleeding, there is increased risk for recurrence of bleeding, contractions, infection, abruption. Reviewed importance of reporting any abdominal pain or cramping, recurrence of vaginal bleeding, abnormal discharge, fever, chills     -Mild anemia  Continue oral hematinic therapy     -VTE prophylaxis  Continue current managementt    There is no uterine tenderness.  Patient has no complaints.  No signs or symptoms of infection.  Continue current therapy    Components of this note were documented using voice recognition systems; and are subject to errors not corrected at proof reading.  Please contact the author for any clarifications.

## 2022-09-11 LAB
BASOPHILS # BLD AUTO: 0.03 X10(3)/MCL (ref 0–0.2)
BASOPHILS NFR BLD AUTO: 0.3 %
EOSINOPHIL # BLD AUTO: 0.32 X10(3)/MCL (ref 0–0.9)
EOSINOPHIL NFR BLD AUTO: 3 %
ERYTHROCYTE [DISTWIDTH] IN BLOOD BY AUTOMATED COUNT: 13.2 % (ref 11.5–17)
GROUP & RH: NORMAL
HCT VFR BLD AUTO: 34.6 % (ref 37–47)
HGB BLD-MCNC: 12.2 GM/DL (ref 12–16)
IMM GRANULOCYTES # BLD AUTO: 0.08 X10(3)/MCL (ref 0–0.04)
IMM GRANULOCYTES NFR BLD AUTO: 0.8 %
INDIRECT COOMBS GEL: NORMAL
LYMPHOCYTES # BLD AUTO: 2.47 X10(3)/MCL (ref 0.6–4.6)
LYMPHOCYTES NFR BLD AUTO: 23.2 %
MCH RBC QN AUTO: 32.4 PG (ref 27–31)
MCHC RBC AUTO-ENTMCNC: 35.3 MG/DL (ref 33–36)
MCV RBC AUTO: 91.8 FL (ref 80–94)
MONOCYTES # BLD AUTO: 0.66 X10(3)/MCL (ref 0.1–1.3)
MONOCYTES NFR BLD AUTO: 6.2 %
NEUTROPHILS # BLD AUTO: 7.1 X10(3)/MCL (ref 2.1–9.2)
NEUTROPHILS NFR BLD AUTO: 66.5 %
NRBC BLD AUTO-RTO: 0 %
PLATELET # BLD AUTO: 213 X10(3)/MCL (ref 130–400)
PMV BLD AUTO: 10 FL (ref 7.4–10.4)
RBC # BLD AUTO: 3.77 X10(6)/MCL (ref 4.2–5.4)
WBC # SPEC AUTO: 10.6 X10(3)/MCL (ref 4.5–11.5)

## 2022-09-11 PROCEDURE — 85025 COMPLETE CBC W/AUTO DIFF WBC: CPT | Performed by: OBSTETRICS & GYNECOLOGY

## 2022-09-11 PROCEDURE — 11000001 HC ACUTE MED/SURG PRIVATE ROOM

## 2022-09-11 PROCEDURE — 86901 BLOOD TYPING SEROLOGIC RH(D): CPT | Performed by: OBSTETRICS & GYNECOLOGY

## 2022-09-11 PROCEDURE — 63600175 PHARM REV CODE 636 W HCPCS: Performed by: OBSTETRICS & GYNECOLOGY

## 2022-09-11 PROCEDURE — 36415 COLL VENOUS BLD VENIPUNCTURE: CPT | Performed by: OBSTETRICS & GYNECOLOGY

## 2022-09-11 PROCEDURE — 72200006 HC VAGINAL DELIVERY LEVEL III

## 2022-09-11 RX ORDER — SIMETHICONE 80 MG
1 TABLET,CHEWABLE ORAL EVERY 6 HOURS PRN
Status: DISCONTINUED | OUTPATIENT
Start: 2022-09-11 | End: 2022-09-13 | Stop reason: HOSPADM

## 2022-09-11 RX ORDER — DEXTROSE, SODIUM CHLORIDE, SODIUM LACTATE, POTASSIUM CHLORIDE, AND CALCIUM CHLORIDE 5; .6; .31; .03; .02 G/100ML; G/100ML; G/100ML; G/100ML; G/100ML
INJECTION, SOLUTION INTRAVENOUS CONTINUOUS
Status: DISCONTINUED | OUTPATIENT
Start: 2022-09-11 | End: 2022-09-11

## 2022-09-11 RX ORDER — SODIUM CHLORIDE 0.9 % (FLUSH) 0.9 %
10 SYRINGE (ML) INJECTION
Status: DISCONTINUED | OUTPATIENT
Start: 2022-09-11 | End: 2022-09-13 | Stop reason: HOSPADM

## 2022-09-11 RX ORDER — METHYLERGONOVINE MALEATE 0.2 MG/ML
200 INJECTION INTRAVENOUS
Status: DISCONTINUED | OUTPATIENT
Start: 2022-09-11 | End: 2022-09-13 | Stop reason: HOSPADM

## 2022-09-11 RX ORDER — DOCUSATE SODIUM 100 MG/1
200 CAPSULE, LIQUID FILLED ORAL 2 TIMES DAILY PRN
Status: DISCONTINUED | OUTPATIENT
Start: 2022-09-11 | End: 2022-09-13 | Stop reason: HOSPADM

## 2022-09-11 RX ORDER — IBUPROFEN 600 MG/1
600 TABLET ORAL EVERY 6 HOURS
Status: DISCONTINUED | OUTPATIENT
Start: 2022-09-12 | End: 2022-09-13 | Stop reason: HOSPADM

## 2022-09-11 RX ORDER — ONDANSETRON 4 MG/1
8 TABLET, ORALLY DISINTEGRATING ORAL EVERY 8 HOURS PRN
Status: DISCONTINUED | OUTPATIENT
Start: 2022-09-11 | End: 2022-09-13 | Stop reason: HOSPADM

## 2022-09-11 RX ORDER — ACETAMINOPHEN 325 MG/1
650 TABLET ORAL EVERY 6 HOURS PRN
Status: DISCONTINUED | OUTPATIENT
Start: 2022-09-11 | End: 2022-09-13 | Stop reason: HOSPADM

## 2022-09-11 RX ORDER — OXYCODONE AND ACETAMINOPHEN 5; 325 MG/1; MG/1
1 TABLET ORAL EVERY 4 HOURS PRN
Status: DISCONTINUED | OUTPATIENT
Start: 2022-09-11 | End: 2022-09-13 | Stop reason: HOSPADM

## 2022-09-11 RX ORDER — OXYTOCIN/RINGER'S LACTATE 30/500 ML
95 PLASTIC BAG, INJECTION (ML) INTRAVENOUS ONCE
Status: COMPLETED | OUTPATIENT
Start: 2022-09-11 | End: 2022-09-11

## 2022-09-11 RX ORDER — ACETAMINOPHEN 325 MG/1
650 TABLET ORAL EVERY 6 HOURS PRN
Status: DISCONTINUED | OUTPATIENT
Start: 2022-09-11 | End: 2022-09-11

## 2022-09-11 RX ORDER — OXYTOCIN/RINGER'S LACTATE 30/500 ML
0-30 PLASTIC BAG, INJECTION (ML) INTRAVENOUS CONTINUOUS
Status: DISCONTINUED | OUTPATIENT
Start: 2022-09-11 | End: 2022-09-11

## 2022-09-11 RX ORDER — PRENATAL WITH FERROUS FUM AND FOLIC ACID 3080; 920; 120; 400; 22; 1.84; 3; 20; 10; 1; 12; 200; 27; 25; 2 [IU]/1; [IU]/1; MG/1; [IU]/1; MG/1; MG/1; MG/1; MG/1; MG/1; MG/1; UG/1; MG/1; MG/1; MG/1; MG/1
1 TABLET ORAL DAILY
Status: DISCONTINUED | OUTPATIENT
Start: 2022-09-12 | End: 2022-09-13 | Stop reason: HOSPADM

## 2022-09-11 RX ORDER — NALBUPHINE HYDROCHLORIDE 10 MG/ML
10 INJECTION, SOLUTION INTRAMUSCULAR; INTRAVENOUS; SUBCUTANEOUS
Status: DISCONTINUED | OUTPATIENT
Start: 2022-09-11 | End: 2022-09-11

## 2022-09-11 RX ORDER — HYDROCORTISONE 25 MG/G
CREAM TOPICAL 3 TIMES DAILY PRN
Status: DISCONTINUED | OUTPATIENT
Start: 2022-09-11 | End: 2022-09-13 | Stop reason: HOSPADM

## 2022-09-11 RX ORDER — OXYTOCIN/RINGER'S LACTATE 30/500 ML
334 PLASTIC BAG, INJECTION (ML) INTRAVENOUS ONCE
Status: COMPLETED | OUTPATIENT
Start: 2022-09-11 | End: 2022-09-11

## 2022-09-11 RX ORDER — PROCHLORPERAZINE EDISYLATE 5 MG/ML
5 INJECTION INTRAMUSCULAR; INTRAVENOUS EVERY 6 HOURS PRN
Status: DISCONTINUED | OUTPATIENT
Start: 2022-09-11 | End: 2022-09-13 | Stop reason: HOSPADM

## 2022-09-11 RX ORDER — OXYTOCIN/RINGER'S LACTATE 30/500 ML
95 PLASTIC BAG, INJECTION (ML) INTRAVENOUS ONCE
Status: DISCONTINUED | OUTPATIENT
Start: 2022-09-11 | End: 2022-09-11

## 2022-09-11 RX ORDER — LIDOCAINE HYDROCHLORIDE 10 MG/ML
10 INJECTION INFILTRATION; PERINEURAL ONCE AS NEEDED
Status: DISCONTINUED | OUTPATIENT
Start: 2022-09-11 | End: 2022-09-11

## 2022-09-11 RX ORDER — OXYCODONE AND ACETAMINOPHEN 10; 325 MG/1; MG/1
1 TABLET ORAL EVERY 4 HOURS PRN
Status: DISCONTINUED | OUTPATIENT
Start: 2022-09-11 | End: 2022-09-13 | Stop reason: HOSPADM

## 2022-09-11 RX ORDER — DIPHENHYDRAMINE HCL 25 MG
25 CAPSULE ORAL EVERY 4 HOURS PRN
Status: DISCONTINUED | OUTPATIENT
Start: 2022-09-11 | End: 2022-09-11

## 2022-09-11 RX ORDER — DIPHENHYDRAMINE HYDROCHLORIDE 50 MG/ML
25 INJECTION INTRAMUSCULAR; INTRAVENOUS EVERY 4 HOURS PRN
Status: DISCONTINUED | OUTPATIENT
Start: 2022-09-11 | End: 2022-09-13 | Stop reason: HOSPADM

## 2022-09-11 RX ADMIN — Medication 2 MILLI-UNITS/MIN: at 08:09

## 2022-09-11 RX ADMIN — Medication 95 MILLI-UNITS/MIN: at 11:09

## 2022-09-11 RX ADMIN — SODIUM CHLORIDE, SODIUM LACTATE, POTASSIUM CHLORIDE, CALCIUM CHLORIDE AND DEXTROSE MONOHYDRATE: 5; 600; 310; 30; 20 INJECTION, SOLUTION INTRAVENOUS at 05:09

## 2022-09-11 RX ADMIN — SODIUM CHLORIDE, POTASSIUM CHLORIDE, SODIUM LACTATE AND CALCIUM CHLORIDE: 600; 310; 30; 20 INJECTION, SOLUTION INTRAVENOUS at 03:09

## 2022-09-11 RX ADMIN — NALBUPHINE HYDROCHLORIDE 5 MG: 10 INJECTION, SOLUTION INTRAMUSCULAR; INTRAVENOUS; SUBCUTANEOUS at 08:09

## 2022-09-11 RX ADMIN — Medication 334 MILLI-UNITS/MIN: at 10:09

## 2022-09-11 NOTE — PROCEDURE NOTE ADDENDUM
LIMITED OBSTETRICAL ULTRASOUND AND BIOPHYSICAL PROFILE  REPORT    DATE OF ULTRASOUND: 09/11/2022     INDICATION: PROM     Gestational Age: 32w1d     Limited obstetrical ultrasound was done showed fetus to be in a cephalic presentation longitudinal lie.  Placenta was anterior.  BPD was 7.87 cm at 31 weeks 4 days, head circumference 28.3 cm at 31 weeks, abdominal circumference at 25.96 cm corresponding to 30 weeks and 1 day at the 5th percentile, and femur length was 5.99 cm at 31 weeks 1 day.  Estimated fetal weight was 1630 g (3 lb 9 oz) at the 24.7% on    Amniotic fluid volume was decreased with an ЕЛЕНА of 5.18.  Her biophysical profile showed normal fetal movement, tone, breathing, and movement with a BPP of 8/8.    Impression: 32 weeks and 1 day gestation with mild fetal growth restriction (abdominal circumference less than 10th percentile) with low-normal amniotic fluid volume and biophysical profile 8/8                Parmjit Ovalle MD       Components of this note were documented using voice recognition systems; and are subject to errors not corrected at proof reading. Please contact author for any clarifications.

## 2022-09-11 NOTE — PROGRESS NOTES
Ms. Rodriguez is 32 1/7 weeks gestation currently admitted with  premature rupture of membranes. She is s/p Celstone x 2 on 8-15 and 22, magnesium sulfate for neuroprotection, and broad spectrum antibiotic therapy. She has history of PTD in last pregnancy at 28 weeks. She is on oral hematinic therapy for mild anemia. She began having contractions this morning, and she was noted to be 4cm dilated. She is feeling contractions but remains cheerful and in good spirits this morning, denies any vaginal bleeding, abnormal discharge, fever, chills    Review of Systems   Constitutional: Negative.    Eyes: Negative.    Respiratory: Negative.     Cardiovascular: Negative.    Gastrointestinal:  Positive for abdominal pain. Negative for constipation, nausea and vomiting.   Endocrine: Negative.    Genitourinary: Negative.    Musculoskeletal: Negative.    Skin: Negative.    Allergic/Immunologic: Negative.    Neurological: Negative.    Hematological: Negative.    Psychiatric/Behavioral: Negative.         History reviewed. No pertinent past medical history.   Past Surgical History:   Procedure Laterality Date    ADENOIDECTOMY      MYRINGOTOMY W/ TUBES      TONSILLECTOMY        Social History     Socioeconomic History    Marital status: Single   Tobacco Use    Smoking status: Never    Smokeless tobacco: Never   Substance and Sexual Activity    Alcohol use: Not Currently    Drug use: Never    Sexual activity: Yes      Family History   Problem Relation Age of Onset    Diabetes Mother     COPD Mother     Hypertension Son       Review of patient's allergies indicates:  No Known Allergies   Prior to Admission medications    Medication Sig Start Date End Date Taking? Authorizing Provider   HYDROXYprogesterone (PERLA) 250 mg/mL (1 mL) Oil Inject 250 mg into the muscle every 7 days.    Historical Provider   prenatal vit 10-iron fum-folic (VITAFOL-OB) 65-1 mg Tab Take 1 tab by mouth daily 5/3/22   Alan Ricketts MD         "    Vitals:    09/11/22 0016 09/11/22 0401 09/11/22 0727 09/11/22 1014   BP: (!) 101/58 (!) 100/57 117/80    Pulse: 74 73 83    Resp:   14    Temp: 98 °F (36.7 °C) 98.1 °F (36.7 °C) 98.3 °F (36.8 °C) 98.2 °F (36.8 °C)   TempSrc: Oral Oral     SpO2:       Weight:       Height:        No LMP recorded. Patient is pregnant.   HT: 5' 3" (160 cm)  WT: 83.9 kg (185 lb)  BMI: 32.8     Temp:  [97.6 °F (36.4 °C)-98.6 °F (37 °C)] 98.2 °F (36.8 °C)  Pulse:  [73-91] 83  Resp:  [14-18] 14  BP: (100-117)/(57-80) 117/80  Physical Exam  Constitutional:       General: She is not in acute distress.  Cardiovascular:      Rate and Rhythm: Normal rate and regular rhythm.      Pulses: Normal pulses.      Heart sounds: Normal heart sounds.   Pulmonary:      Effort: Pulmonary effort is normal. No respiratory distress.      Breath sounds: Normal breath sounds.   Abdominal:      Palpations: Abdomen is soft.      Tenderness: There is no abdominal tenderness.      Comments: Gravid uterus, no uterine tenderness   Musculoskeletal:      Right lower leg: No edema.      Left lower leg: No edema.   Skin:     General: Skin is warm and dry.      Capillary Refill: Capillary refill takes less than 2 seconds.   Neurological:      Mental Status: She is alert and oriented to person, place, and time. Mental status is at baseline.   Psychiatric:         Mood and Affect: Mood normal.         Behavior: Behavior normal.         Thought Content: Thought content normal.     Recent Results (from the past 48 hour(s))   Type & Screen    Collection Time: 09/11/22  7:43 AM   Result Value Ref Range    Group & Rh O POS     Indirect [place] GEL NEG    CBC with Differential    Collection Time: 09/11/22  7:43 AM   Result Value Ref Range    WBC 10.6 4.5 - 11.5 x10(3)/mcL    RBC 3.77 (L) 4.20 - 5.40 x10(6)/mcL    Hgb 12.2 12.0 - 16.0 gm/dL    Hct 34.6 (L) 37.0 - 47.0 %    MCV 91.8 80.0 - 94.0 fL    MCH 32.4 (H) 27.0 - 31.0 pg    MCHC 35.3 33.0 - 36.0 mg/dL    RDW 13.2 11.5 - " 17.0 %    Platelet 213 130 - 400 x10(3)/mcL    MPV 10.0 7.4 - 10.4 fL    Neut % 66.5 %    Lymph % 23.2 %    Mono % 6.2 %    Eos % 3.0 %    Basophil % 0.3 %    Lymph # 2.47 0.6 - 4.6 x10(3)/mcL    Neut # 7.1 2.1 - 9.2 x10(3)/mcL    Mono # 0.66 0.1 - 1.3 x10(3)/mcL    Eos # 0.32 0 - 0.9 x10(3)/mcL    Baso # 0.03 0 - 0.2 x10(3)/mcL    IG# 0.08 (H) 0 - 0.04 x10(3)/mcL    IG% 0.8 %    NRBC% 0.0 %      US OB Non Rad 14+ Weeks TransAbd, w/Biophysical Profile, w/o NST (xpd)  See Provider Notes for results.     IMPRESSION: Please see provider notes for interpretation    This procedure was auto-finalized by: Virtual Radiologist  US OB Non Rad Limited 1 Or More Gestations  See Provider Notes for results.     IMPRESSION: Please see provider notes for interpretation    This procedure was auto-finalized by: Virtual Radiologist        Assessment and Plan  32w1d  premature rupture of membranes, in labor  FHT currently 135 baseline, moderate variability, 15x15 accels, occasional variable decels, contractions every 3-4 minutes  GBS negative  Discussed option of rescue course of steroids with risks/benefits with patient. With patient likely to delivery today, probably minimal benefit to rescue course of steroids, and patient agreed to avoid at this time.   Reviewed with patient importance of early prenatal care in any future pregnacny for CL surveillance and progesterone at 16 weeks.     Mild anemia  Could hold oral supplements while NPO, and resume oral hematinic therapy after delivery if indicated     VTE ppx  Contiue current management    Discussed above with Dr. Ovalle

## 2022-09-12 PROCEDURE — 25000003 PHARM REV CODE 250: Performed by: OBSTETRICS & GYNECOLOGY

## 2022-09-12 PROCEDURE — 11000001 HC ACUTE MED/SURG PRIVATE ROOM

## 2022-09-12 RX ADMIN — IBUPROFEN 600 MG: 600 TABLET ORAL at 12:09

## 2022-09-12 NOTE — PROGRESS NOTES
Pt. Back from NICU. Pt. Eating. Instructed to call when ready to pump for assistance. Equipment in room. Pt. Verbalized understanding.

## 2022-09-12 NOTE — OP NOTE
[facility]                       [address] [id number]    PATIENT NAME:      ALEXANDER RODRIGUEZ   YOB: 2000  CSN:               [id number]  MRN:               [id number]  ADMIT DATE:        08/15/2022 09:18:00  PHYSICIAN:         Alan Ricketts MD                          OPERATIVE REPORT      DATE OF SURGERY:        SURGEON:  Alan Ricketts MD    Ms. Rodriguez is a known patient who has been in the hospital for over a   month with PPROM.  She went into active labor at 32 weeks and delivered a viable   female infant.  Apgars and weight are pending from the NICU team.  The patient   delivered in Jatinder position with maternal Valsalva and delivered the baby   direct OP followed by anterior shoulder without difficulty.  Cord doubly clamped   and cut and the baby handed off expeditiously to the awaiting NICU team.    Placenta then delivered spontaneously intact.  No lacerations or tears were   noted.  The uterus is firm.  Routine postpartum orders will be placed.  The NICU   has the baby in the NICU [place].        ______________________________  Alan Ricketts MD    EPE/AQS  DD:  09/11/2022  Time:  10:41PM  DT:  09/12/2022  Time:  04:06AM  Job #:  [id number]      OPERATIVE REPORT

## 2022-09-13 VITALS
SYSTOLIC BLOOD PRESSURE: 103 MMHG | WEIGHT: 185 LBS | RESPIRATION RATE: 18 BRPM | HEIGHT: 63 IN | TEMPERATURE: 98 F | OXYGEN SATURATION: 98 % | HEART RATE: 72 BPM | BODY MASS INDEX: 32.78 KG/M2 | DIASTOLIC BLOOD PRESSURE: 69 MMHG

## 2022-09-13 PROCEDURE — 25000003 PHARM REV CODE 250: Performed by: OBSTETRICS & GYNECOLOGY

## 2022-09-13 RX ADMIN — DOCUSATE SODIUM 200 MG: 100 CAPSULE, LIQUID FILLED ORAL at 12:09

## 2022-09-13 RX ADMIN — IBUPROFEN 600 MG: 600 TABLET ORAL at 08:09

## 2022-09-13 RX ADMIN — PRENATAL VITAMINS-IRON FUMARATE 27 MG IRON-FOLIC ACID 0.8 MG TABLET 1 TABLET: at 08:09

## 2022-09-13 RX ADMIN — IBUPROFEN 600 MG: 600 TABLET ORAL at 12:09

## 2022-09-13 NOTE — DISCHARGE SUMMARY
[facility] - 2nd Floor Mother/Baby Unit  Obstetrics  Discharge Summary      Patient Name: Kvng Rodriguez  MRN: [id number]  Admission Date: 8/15/2022  Hospital Length of Stay: 29 days  Discharge Date and Time:  2022 8:10 AM  Attending Physician: Alan Ricketts MD   Discharging Provider: Alan Ricketts MD   Primary Care Provider: No primary care provider on file.    HPI: No notes on file    FHT: 145Cat 1 (reassuring)  TOCO:  Q 0 minutes    * No surgery found *     Hospital Course:   No notes on file     Consults (From admission, onward)          Status Ordering Provider     Inpatient consult to Neonatology  Once        Provider:  (Not yet assigned)    LATESHA Moreau     Inpatient consult to Maternal Fetal Medicine  Once        Provider:  MD Billie Diaz CHARISE S            Final Active Diagnoses:    Diagnosis Date Noted POA    PRINCIPAL PROBLEM:   premature rupture of membranes (PPROM) with unknown onset of labor [O42.919] 08/15/2022 Yes      Problems Resolved During this Admission:        Significant Diagnostic Studies: Labs: All labs within the past 24 hours have been reviewed      Feeding Method: both breast and bottle    Immunizations       Date Immunization Status Dose Route/Site Given by    22 MMR Deleted 0.5 mL Subcutaneous/     22 Tdap Deleted 0.5 mL Intramuscular/             Delivery:    Episiotomy: None   Lacerations: None   Repair suture: None   Repair # of packets:     Blood loss (ml):       Birth information:  YOB: 2023   Time of birth: 10:29 PM   Sex: female   Delivery type: Vaginal, Spontaneous   Gestational Age: 32w1d    Delivery Clinician:      Other providers:       Additional  information:  Forceps:    Vacuum:    Breech:    Observed anomalies      Living?:           APGARS  One minute Five minutes Ten minutes   Skin color:         Heart rate:         Grimace:         Muscle tone:         Breathing:          Totals: 8  9        Placenta: Delivered:       appearance  Pending Diagnostic Studies:       None            Discharged Condition: stable    Disposition: Home or Self Care    Follow Up:    Patient Instructions:   No discharge procedures on file.  Medications:  Current Discharge Medication List        STOP taking these medications       HYDROXYprogesterone (PERLA) 250 mg/mL (1 mL) Oil Comments:   Reason for Stopping:         prenatal vit 10-iron fum-folic (VITAFOL-OB) 65-1 mg Tab Comments:   Reason for Stopping:               Alan Ricketts MD  Obstetrics  [facility] - 2nd Floor Mother/Baby Unit

## 2022-09-15 LAB
ESTROGEN SERPL-MCNC: NORMAL PG/ML
INSULIN SERPL-ACNC: NORMAL U[IU]/ML
LAB AP CLINICAL INFORMATION: NORMAL
LAB AP GROSS DESCRIPTION: NORMAL
LAB AP REPORT FOOTNOTES: NORMAL
T3RU NFR SERPL: NORMAL %

## 2023-07-24 PROBLEM — O42.919 PRETERM PREMATURE RUPTURE OF MEMBRANES (PPROM) WITH UNKNOWN ONSET OF LABOR: Status: RESOLVED | Noted: 2022-08-15 | Resolved: 2023-07-24

## 2023-11-29 ENCOUNTER — CLINICAL SUPPORT (OUTPATIENT)
Dept: URGENT CARE | Facility: CLINIC | Age: 23
End: 2023-11-29
Payer: MEDICAID

## 2023-11-29 VITALS
WEIGHT: 160 LBS | OXYGEN SATURATION: 99 % | HEIGHT: 63 IN | BODY MASS INDEX: 28.35 KG/M2 | DIASTOLIC BLOOD PRESSURE: 74 MMHG | RESPIRATION RATE: 20 BRPM | TEMPERATURE: 100 F | HEART RATE: 102 BPM | SYSTOLIC BLOOD PRESSURE: 109 MMHG

## 2023-11-29 DIAGNOSIS — U07.1 COVID-19: ICD-10-CM

## 2023-11-29 DIAGNOSIS — J02.9 SORE THROAT: ICD-10-CM

## 2023-11-29 DIAGNOSIS — R50.9 FEVER, UNSPECIFIED FEVER CAUSE: Primary | ICD-10-CM

## 2023-11-29 LAB
CTP QC/QA: YES
CTP QC/QA: YES
MOLECULAR STREP A: NEGATIVE
SARS-COV-2 RDRP RESP QL NAA+PROBE: POSITIVE

## 2023-11-29 PROCEDURE — 99204 OFFICE O/P NEW MOD 45 MIN: CPT | Mod: S$PBB,,,

## 2023-11-29 PROCEDURE — 87635 SARS-COV-2 COVID-19 AMP PRB: CPT | Mod: PBBFAC

## 2023-11-29 PROCEDURE — 25000003 PHARM REV CODE 250

## 2023-11-29 PROCEDURE — 99213 OFFICE O/P EST LOW 20 MIN: CPT | Mod: PBBFAC

## 2023-11-29 PROCEDURE — 87651 STREP A DNA AMP PROBE: CPT | Mod: PBBFAC

## 2023-11-29 PROCEDURE — 99204 PR OFFICE/OUTPT VISIT, NEW, LEVL IV, 45-59 MIN: ICD-10-PCS | Mod: S$PBB,,,

## 2023-11-29 RX ORDER — ACETAMINOPHEN 325 MG/1
650 TABLET ORAL
Status: COMPLETED | OUTPATIENT
Start: 2023-11-29 | End: 2023-11-29

## 2023-11-29 RX ADMIN — ACETAMINOPHEN 650 MG: 325 TABLET ORAL at 08:11

## 2023-11-29 NOTE — LETTER
November 29, 2023      [facility]  [address] [address]  Phone: [phone]       Patient: Kvng Rodriguez   YOB: 2000  Date of Visit: 11/29/2023    To Whom It May Concern:    Venus Rodriguez  was at [facility] on 11/29/2023. The patient may return to work/school on 12/02/2023. with no restrictions. If you have any questions or concerns, or if I can be of further assistance, please do not hesitate to contact me.    Sincerely,    CANDY Joyner

## 2023-11-30 NOTE — PROGRESS NOTES
"Subjective:       Patient ID: Kvng Rodriguez is a 23 y.o. female.    Vitals:  height is 5' 3" (1.6 m) and weight is 72.6 kg (160 lb). Her oral temperature is 100.4 °F (38 °C) (abnormal). Her blood pressure is 109/74 and her pulse is 102. Her respiration is 20 and oxygen saturation is 99%.     Chief Complaint: URI (Cough, headache, upper left back pain and fever. Patient reports her son had Covid 2 weeks ago, she took a Covid test at home it was positive. Patient also reports a sore throat.)    Reports symptoms present x 3 days ago. Covid test positive @ home today with faint line. Requesting Covid documentation for work.     URI   This is a new problem. The current episode started in the past 7 days. Associated symptoms include congestion, coughing, headaches, rhinorrhea, sneezing and a sore throat. Pertinent negatives include no diarrhea or vomiting. She has tried acetaminophen for the symptoms. The treatment provided mild relief.       HENT:  Positive for congestion and sore throat.    Respiratory:  Positive for cough.    Gastrointestinal:  Negative for vomiting and diarrhea.   Allergic/Immunologic: Positive for sneezing.   Neurological:  Positive for headaches.       Objective:      Physical Exam   Constitutional: She is oriented to person, place, and time. She is cooperative.   HENT:   Head: Normocephalic and atraumatic.   Ears:   Left Ear: Tympanic membrane normal.   Nose: Mucosal edema and rhinorrhea present.   Mouth/Throat: Uvula is midline, oropharynx is clear and moist and mucous membranes are normal. Tonsils are 0 on the right. Tonsils are 0 on the left.   Absent Uvula       Comments: Absent Uvula   Eyes: Conjunctivae and lids are normal.   Cardiovascular: Normal rate, regular rhythm and normal heart sounds.   Pulmonary/Chest: Effort normal and breath sounds normal.   Abdominal: Normal appearance.   Lymphadenopathy:     She has no cervical adenopathy.   Neurological: no focal deficit. She is alert and " oriented to person, place, and time. GCS eye subscore is 4. GCS verbal subscore is 5. GCS motor subscore is 6.   Skin: Skin is warm, dry and intact.   Psychiatric: Her behavior is normal.   Nursing note reviewed.        Assessment:       1. Fever, unspecified fever cause    2. Sore throat    3. COVID-19          Plan:     Fever treated in clinic with Tylenol. Rapid Covid is positive today in clinic. Symptomatic therapy suggested: Ensure adequate hydration, may apply warm compress to sinus as needed. Tylenol/Motrin as tolerated. Return to clinic or follow up with PCP if these symptoms worsen or fail to improve as anticipated. ED precautions discussed.      Fever, unspecified fever cause  -     acetaminophen tablet 650 mg    Sore throat  -     COVID/RSV/FLU A&B PCR; Future; Expected date: 11/29/2023  -     POCT Strep A, Molecular  -     POCT COVID-19 Rapid Screening    COVID-19

## 2024-01-02 ENCOUNTER — HOSPITAL ENCOUNTER (EMERGENCY)
Facility: HOSPITAL | Age: 24
Discharge: HOME | End: 2024-01-02
Attending: STUDENT IN AN ORGANIZED HEALTH CARE EDUCATION/TRAINING PROGRAM
Payer: MEDICAID

## 2024-01-02 VITALS
RESPIRATION RATE: 18 BRPM | BODY MASS INDEX: 29.41 KG/M2 | HEART RATE: 85 BPM | TEMPERATURE: 98 F | OXYGEN SATURATION: 100 % | DIASTOLIC BLOOD PRESSURE: 78 MMHG | HEIGHT: 63 IN | SYSTOLIC BLOOD PRESSURE: 118 MMHG | WEIGHT: 166 LBS

## 2024-01-02 DIAGNOSIS — N30.00 ACUTE CYSTITIS WITHOUT HEMATURIA: Primary | ICD-10-CM

## 2024-01-02 LAB
ALBUMIN SERPL-MCNC: 3.9 G/DL (ref 3.5–5)
ALBUMIN/GLOB SERPL: 1.3 RATIO (ref 1.1–2)
ALP SERPL-CCNC: 59 UNIT/L (ref 40–150)
ALT SERPL-CCNC: 13 UNIT/L (ref 0–55)
APPEARANCE UR: ABNORMAL
AST SERPL-CCNC: 12 UNIT/L (ref 5–34)
B-HCG UR QL: NEGATIVE
BACTERIA #/AREA URNS AUTO: ABNORMAL /HPF
BASOPHILS # BLD AUTO: 0.01 X10(3)/MCL
BASOPHILS NFR BLD AUTO: 0.2 %
BILIRUB SERPL-MCNC: 0.9 MG/DL
BILIRUB UR QL STRIP.AUTO: NEGATIVE
BUN SERPL-MCNC: 11.5 MG/DL (ref 7–18.7)
C TRACH DNA SPEC QL NAA+PROBE: NOT DETECTED
CALCIUM SERPL-MCNC: 9 MG/DL (ref 8.4–10.2)
CHLORIDE SERPL-SCNC: 110 MMOL/L (ref 98–107)
CO2 SERPL-SCNC: 21 MMOL/L (ref 22–29)
COLOR UR AUTO: YELLOW
CREAT SERPL-MCNC: 0.83 MG/DL (ref 0.55–1.02)
CTP QC/QA: YES
EOSINOPHIL # BLD AUTO: 0.16 X10(3)/MCL (ref 0–0.9)
EOSINOPHIL NFR BLD AUTO: 3.3 %
ERYTHROCYTE [DISTWIDTH] IN BLOOD BY AUTOMATED COUNT: 12 % (ref 11.5–17)
FLUAV AG UPPER RESP QL IA.RAPID: NOT DETECTED
FLUBV AG UPPER RESP QL IA.RAPID: NOT DETECTED
GFR SERPLBLD CREATININE-BSD FMLA CKD-EPI: >60 MLS/MIN/1.73/M2
GLOBULIN SER-MCNC: 3.1 GM/DL (ref 2.4–3.5)
GLUCOSE SERPL-MCNC: 85 MG/DL (ref 74–100)
GLUCOSE UR QL STRIP.AUTO: NORMAL
HCT VFR BLD AUTO: 43.3 % (ref 37–47)
HGB BLD-MCNC: 14.9 G/DL (ref 12–16)
HOLD SPECIMEN: NORMAL
HYALINE CASTS #/AREA URNS LPF: ABNORMAL /LPF
IMM GRANULOCYTES # BLD AUTO: 0.02 X10(3)/MCL (ref 0–0.04)
IMM GRANULOCYTES NFR BLD AUTO: 0.4 %
KETONES UR QL STRIP.AUTO: NEGATIVE
LEUKOCYTE ESTERASE UR QL STRIP.AUTO: 500
LYMPHOCYTES # BLD AUTO: 1.68 X10(3)/MCL (ref 0.6–4.6)
LYMPHOCYTES NFR BLD AUTO: 35.1 %
MCH RBC QN AUTO: 31.1 PG (ref 27–31)
MCHC RBC AUTO-ENTMCNC: 34.4 G/DL (ref 33–36)
MCV RBC AUTO: 90.4 FL (ref 80–94)
MONOCYTES # BLD AUTO: 0.67 X10(3)/MCL (ref 0.1–1.3)
MONOCYTES NFR BLD AUTO: 14 %
MUCOUS THREADS URNS QL MICRO: ABNORMAL /LPF
N GONORRHOEA DNA SPEC QL NAA+PROBE: NOT DETECTED
NEUTROPHILS # BLD AUTO: 2.25 X10(3)/MCL (ref 2.1–9.2)
NEUTROPHILS NFR BLD AUTO: 47 %
NITRITE UR QL STRIP.AUTO: NEGATIVE
NRBC BLD AUTO-RTO: 0 %
PH UR STRIP.AUTO: 6.5 [PH]
PLATELET # BLD AUTO: 198 X10(3)/MCL (ref 130–400)
PMV BLD AUTO: 10.3 FL (ref 7.4–10.4)
POTASSIUM SERPL-SCNC: 4.1 MMOL/L (ref 3.5–5.1)
PROT SERPL-MCNC: 7 GM/DL (ref 6.4–8.3)
PROT UR QL STRIP.AUTO: NEGATIVE
RBC # BLD AUTO: 4.79 X10(6)/MCL (ref 4.2–5.4)
RBC #/AREA URNS AUTO: ABNORMAL /HPF
RBC UR QL AUTO: NEGATIVE
SARS-COV-2 RNA RESP QL NAA+PROBE: NOT DETECTED
SODIUM SERPL-SCNC: 138 MMOL/L (ref 136–145)
SOURCE (OHS): NORMAL
SP GR UR STRIP.AUTO: 1.02 (ref 1–1.03)
SQUAMOUS #/AREA URNS LPF: ABNORMAL /HPF
UROBILINOGEN UR STRIP-ACNC: NORMAL
WBC # SPEC AUTO: 4.79 X10(3)/MCL (ref 4.5–11.5)
WBC #/AREA URNS AUTO: ABNORMAL /HPF

## 2024-01-02 PROCEDURE — 81025 URINE PREGNANCY TEST: CPT | Performed by: PHYSICIAN ASSISTANT

## 2024-01-02 PROCEDURE — 87086 URINE CULTURE/COLONY COUNT: CPT | Performed by: PHYSICIAN ASSISTANT

## 2024-01-02 PROCEDURE — 85025 COMPLETE CBC W/AUTO DIFF WBC: CPT | Performed by: PHYSICIAN ASSISTANT

## 2024-01-02 PROCEDURE — 80053 COMPREHEN METABOLIC PANEL: CPT | Performed by: PHYSICIAN ASSISTANT

## 2024-01-02 PROCEDURE — 87491 CHLMYD TRACH DNA AMP PROBE: CPT | Performed by: PHYSICIAN ASSISTANT

## 2024-01-02 PROCEDURE — 81001 URINALYSIS AUTO W/SCOPE: CPT | Performed by: PHYSICIAN ASSISTANT

## 2024-01-02 PROCEDURE — 0240U COVID/FLU A&B PCR: CPT | Performed by: PHYSICIAN ASSISTANT

## 2024-01-02 PROCEDURE — 99283 EMERGENCY DEPT VISIT LOW MDM: CPT

## 2024-01-02 RX ORDER — NITROFURANTOIN 25; 75 MG/1; MG/1
100 CAPSULE ORAL 2 TIMES DAILY
Qty: 14 CAPSULE | Refills: 0 | Status: SHIPPED | OUTPATIENT
Start: 2024-01-02 | End: 2024-01-09

## 2024-01-03 NOTE — ED PROVIDER NOTES
"Encounter Date: 1/2/2024       History     Chief Complaint   Patient presents with    Fatigue     Weakness with chills (on the inside) (x)1 hour. Jerry hinkle     23-year-old female presents to the emergency department with complaints of generalized fatigue and chills "on the inside" x 1 hour.  Patient states she had a stomach virus yesterday with vomiting and diarrhea and thought she may be dehydrated.  She denies fever, cough, nasal congestion.  Nausea, diarrhea and vomiting resolved today.  She is now able to eat and drink.    The history is provided by the patient. No  was used.     Review of patient's allergies indicates:  No Known Allergies  No past medical history on file.  Past Surgical History:   Procedure Laterality Date    ADENOIDECTOMY      MYRINGOTOMY W/ TUBES      TONSILLECTOMY       Family History   Problem Relation Age of Onset    Diabetes Mother     COPD Mother     Hypertension Son      Social History     Tobacco Use    Smoking status: Never    Smokeless tobacco: Never   Substance Use Topics    Alcohol use: Not Currently    Drug use: Never     Review of Systems   Constitutional:  Positive for chills and fatigue. Negative for fever.   HENT:  Negative for congestion and sore throat.    Eyes: Negative.    Respiratory:  Negative for cough, chest tightness and shortness of breath.    Cardiovascular:  Negative for chest pain and palpitations.   Gastrointestinal:  Negative for abdominal pain, diarrhea, nausea and vomiting.   Genitourinary:  Negative for decreased urine volume, dysuria and hematuria.   Musculoskeletal:  Negative for back pain.   Skin:  Negative for rash and wound.       Physical Exam     Initial Vitals [01/02/24 1644]   BP Pulse Resp Temp SpO2   118/78 85 18 97.9 °F (36.6 °C) 100 %      MAP       --         Physical Exam    Nursing note and vitals reviewed.  Constitutional: She appears well-developed and well-nourished.   HENT:   Nose: Nose normal.   Eyes: Conjunctivae are " normal.   Neck: Neck supple.   Normal range of motion.  Cardiovascular:  Normal rate, regular rhythm, normal heart sounds and intact distal pulses.           Pulmonary/Chest: Breath sounds normal. No respiratory distress. She has no wheezes. She has no rhonchi. She has no rales. She exhibits no tenderness.   Abdominal: Abdomen is soft. Bowel sounds are normal. There is no abdominal tenderness. There is no rebound and no guarding.   Musculoskeletal:         General: Normal range of motion.      Cervical back: Normal range of motion and neck supple.     Neurological: She is alert. GCS score is 15. GCS eye subscore is 4. GCS verbal subscore is 5. GCS motor subscore is 6.   Skin: Skin is warm. Capillary refill takes less than 2 seconds.         ED Course   Procedures  Labs Reviewed   COMPREHENSIVE METABOLIC PANEL - Abnormal; Notable for the following components:       Result Value    Chloride 110 (*)     Carbon Dioxide 21 (*)     All other components within normal limits   URINALYSIS, REFLEX TO URINE CULTURE - Abnormal; Notable for the following components:    Appearance, UA Turbid (*)     Leukocyte Esterase,  (*)     WBC, UA 11-20 (*)     Bacteria, UA Trace (*)     Squamous Epithelial Cells, UA Many (*)     Mucous, UA Trace (*)     All other components within normal limits   CBC WITH DIFFERENTIAL - Abnormal; Notable for the following components:    MCH 31.1 (*)     All other components within normal limits   COVID/FLU A&B PCR - Normal    Narrative:     The Xpert Xpress SARS-CoV-2/FLU/RSV plus is a rapid, multiplexed real-time PCR test intended for the simultaneous qualitative detection and differentiation of SARS-CoV-2, Influenza A, Influenza B, and respiratory syncytial virus (RSV) viral RNA in either nasopharyngeal swab or nasal swab specimens.         CULTURE, URINE   CHLAMYDIA/GONORRHOEAE(GC), PCR   CBC W/ AUTO DIFFERENTIAL    Narrative:     The following orders were created for panel order CBC Auto  "Differential.  Procedure                               Abnormality         Status                     ---------                               -----------         ------                     CBC with Differential[[id number]]        Abnormal            Final result                 Please view results for these tests on the individual orders.   EXTRA TUBES    Narrative:     The following orders were created for panel order EXTRA TUBES.  Procedure                               Abnormality         Status                     ---------                               -----------         ------                     Light Blue Top Hold[[id number]]                              In process                 Light Green Top Hold[[id number]]                             In process                 Gold Top Hold[[id number]]                                    In process                   Please view results for these tests on the individual orders.   LIGHT BLUE TOP HOLD   LIGHT GREEN TOP HOLD   GOLD TOP HOLD   POCT URINE PREGNANCY          Imaging Results    None          Medications - No data to display  Medical Decision Making  23-year-old female presents to the emergency department with complaints of generalized fatigue and chills "on the inside" x 1 hour.  Patient states she had a stomach virus yesterday with vomiting and diarrhea and thought she may be dehydrated.  She denies fever, cough, nasal congestion.  Nausea, diarrhea and vomiting resolved today.  She is now able to eat and drink.    Leukocytes, WBC, bacteria + .  Many squamous cells.  Possible contamination.   Will prescribe Macrobid.  Nasal and throat swabs negative.  Blood work unremarkable, no signs of dehydration.    Amount and/or Complexity of Data Reviewed  Labs: ordered. Decision-making details documented in ED Course.    Risk  Prescription drug management.               ED Course as of 01/02/24 1857   Tue Jan 02, 2024   1800 Leukocytes, UA(!): 500 [ER]   1800 WBC, UA(!): " 11-20 [ER]   1800 Bacteria, UA(!): Trace [ER]   1800 Squamous Epithelial Cells, UA(!): Many [ER]   1812 Influenza A, Molecular: Not Detected [ER]   1812 Influenza B, Molecular: Not Detected [ER]   1812 SARS-CoV2 (COVID-19) Qualitative PCR: Not Detected [ER]      ED Course User Index  [ER] Mary Blackwood PA                           Clinical Impression:  Final diagnoses:  [N30.00] Acute cystitis without hematuria (Primary)          ED Disposition Condition    Discharge Stable          ED Prescriptions       Medication Sig Dispense Start Date End Date Auth. Provider    nitrofurantoin, macrocrystal-monohydrate, (MACROBID) 100 MG capsule Take 1 capsule (100 mg total) by mouth 2 (two) times daily. for 7 days 14 capsule 1/2/2024 1/9/2024 Mary Blackwood PA          Follow-up Information       Follow up With Specialties Details Why Contact Info    [facility] Emergency Medicine  As needed, If symptoms worsen [address] [facility] [address] [phone]             Mary Blackwood PA  01/02/24 4825

## 2024-01-03 NOTE — DISCHARGE INSTRUCTIONS
Take antibiotics with food and water as prescribed.  Return if symptoms worsen.  Follow up with the primary care provider within 2-3 days.

## 2024-01-04 LAB — BACTERIA UR CULT: NORMAL

## 2024-05-11 ENCOUNTER — OFFICE VISIT (OUTPATIENT)
Dept: URGENT CARE | Facility: CLINIC | Age: 24
End: 2024-05-11
Payer: MEDICAID

## 2024-05-11 VITALS
HEIGHT: 63 IN | OXYGEN SATURATION: 97 % | BODY MASS INDEX: 30.48 KG/M2 | DIASTOLIC BLOOD PRESSURE: 80 MMHG | SYSTOLIC BLOOD PRESSURE: 120 MMHG | TEMPERATURE: 98 F | RESPIRATION RATE: 20 BRPM | WEIGHT: 172 LBS | HEART RATE: 97 BPM

## 2024-05-11 DIAGNOSIS — R05.9 COUGH IN ADULT PATIENT: Primary | ICD-10-CM

## 2024-05-11 PROCEDURE — 99213 OFFICE O/P EST LOW 20 MIN: CPT | Mod: S$PBB,,,

## 2024-05-11 PROCEDURE — 99214 OFFICE O/P EST MOD 30 MIN: CPT | Mod: PBBFAC

## 2024-05-11 RX ORDER — GUAIFENESIN 100 MG/5ML
200 SOLUTION ORAL 3 TIMES DAILY PRN
Qty: 118 ML | Refills: 0 | Status: SHIPPED | OUTPATIENT
Start: 2024-05-11 | End: 2024-05-21

## 2024-05-11 NOTE — PROGRESS NOTES
"Subjective:       Patient ID: Kvng Rodriguez is a 24 y.o. female.    Vitals:  height is 5' 3" (1.6 m) and weight is 78 kg (172 lb). Her oral temperature is 98.2 °F (36.8 °C). Her blood pressure is 120/80 and her pulse is 97. Her respiration is 20 and oxygen saturation is 97%.     Chief Complaint: Cough (Cough and post nasal drip x 2 weeks. )    24-year-old  female presents to clinic with children, reports residual cough x 2 weeks with, has not tried any over-the-counter treatment.  States wants to make sure she has not contagious related to ICU visitation privileges.    Cough  Associated symptoms include headaches and postnasal drip. Pertinent negatives include no chills, fever or shortness of breath.       Constitution: Negative for chills and fever.   HENT:  Positive for postnasal drip.    Respiratory:  Positive for cough and asthma. Negative for sputum production and shortness of breath.    Allergic/Immunologic: Positive for seasonal allergies and asthma. Negative for chronic cough.   Neurological:  Positive for headaches.       Objective:      Physical Exam   Constitutional: She is oriented to person, place, and time. She is cooperative. She is easily aroused. She does not appear ill. awake  HENT:   Head: Normocephalic and atraumatic.   Ears:   Right Ear: Tympanic membrane normal.   Left Ear: Tympanic membrane normal.   Nose: Nose normal.   Mouth/Throat: Oropharynx is clear and moist and mucous membranes are normal. Tonsils are 0 on the right. Tonsils are 0 on the left. No tonsillar exudate.   +absent uvula       Comments: +absent uvula   Eyes: Conjunctivae and lids are normal.   Neck: Neck supple.   Cardiovascular: Normal rate, regular rhythm, S1 normal, S2 normal and normal heart sounds.   Pulmonary/Chest: Effort normal and breath sounds normal.   Abdominal: Normal appearance.   Lymphadenopathy:     She has no cervical adenopathy.   Neurological: She is alert, oriented to person, place, and time " and easily aroused. Gait normal. GCS eye subscore is 4. GCS verbal subscore is 5. GCS motor subscore is 6.   Skin: Skin is warm, dry and intact. Capillary refill takes less than 2 seconds.   Psychiatric: Her behavior is normal.   Nursing note and vitals reviewed.        Assessment:       1. Cough in adult patient          Plan:     Normal exam , no testing warranted for today's visit. Encouraged patient to remain hydrated, honey as natural cough suppressant. Follow up with PCP as needed.     Cough in adult patient  -     guaiFENesin 100 mg/5 ml (ROBITUSSIN) 100 mg/5 mL syrup; Take 10 mLs (200 mg total) by mouth 3 (three) times daily as needed.  Dispense: 118 mL; Refill: 0

## 2024-11-20 NOTE — PLAN OF CARE
Problem:  Fall Injury Risk  Goal: Absence of Fall, Infant Drop and Related Injury  Outcome: Ongoing, Progressing     Problem: Adult Inpatient Plan of Care  Goal: Plan of Care Review  Outcome: Ongoing, Progressing  Goal: Absence of Hospital-Acquired Illness or Injury  Outcome: Ongoing, Progressing  Goal: Optimal Comfort and Wellbeing  Outcome: Ongoing, Progressing  Goal: Readiness for Transition of Care  Outcome: Ongoing, Progressing     Problem: Prelabor Rupture of Membranes  Goal: Delayed Delivery with Absence of Infection  Outcome: Ongoing, Progressing     Problem: Infection  Goal: Absence of Infection Signs and Symptoms  Outcome: Ongoing, Progressing     
  Problem:  Fall Injury Risk  Goal: Absence of Fall, Infant Drop and Related Injury  Outcome: Ongoing, Progressing     Problem: Adult Inpatient Plan of Care  Goal: Plan of Care Review  Outcome: Ongoing, Progressing  Goal: Patient-Specific Goal (Individualized)  Outcome: Ongoing, Progressing  Goal: Absence of Hospital-Acquired Illness or Injury  Outcome: Ongoing, Progressing  Goal: Optimal Comfort and Wellbeing  Outcome: Ongoing, Progressing  Goal: Readiness for Transition of Care  Outcome: Ongoing, Progressing     Problem: Adjustment to Role Transition (Postpartum Vaginal Delivery)  Goal: Successful Maternal Role Transition  Outcome: Ongoing, Progressing     Problem: Bleeding (Postpartum Vaginal Delivery)  Goal: Hemostasis  Outcome: Ongoing, Progressing     Problem: Infection (Postpartum Vaginal Delivery)  Goal: Absence of Infection Signs/Symptoms  Outcome: Ongoing, Progressing     Problem: Pain (Postpartum Vaginal Delivery)  Goal: Acceptable Pain Control  Outcome: Ongoing, Progressing     
  Problem:  Fall Injury Risk  Goal: Absence of Fall, Infant Drop and Related Injury  Outcome: Ongoing, Progressing     Problem: Adult Inpatient Plan of Care  Goal: Plan of Care Review  Outcome: Ongoing, Progressing  Goal: Patient-Specific Goal (Individualized)  Outcome: Ongoing, Progressing  Goal: Absence of Hospital-Acquired Illness or Injury  Outcome: Ongoing, Progressing  Goal: Optimal Comfort and Wellbeing  Outcome: Ongoing, Progressing  Goal: Readiness for Transition of Care  Outcome: Ongoing, Progressing     Problem: Prelabor Rupture of Membranes  Goal: Delayed Delivery with Absence of Infection  Outcome: Ongoing, Progressing     Problem: Infection  Goal: Absence of Infection Signs and Symptoms  Outcome: Ongoing, Progressing     
  Problem:  Fall Injury Risk  Goal: Absence of Fall, Infant Drop and Related Injury  Outcome: Ongoing, Progressing     Problem: Adult Inpatient Plan of Care  Goal: Plan of Care Review  Outcome: Ongoing, Progressing  Goal: Patient-Specific Goal (Individualized)  Outcome: Ongoing, Progressing  Goal: Absence of Hospital-Acquired Illness or Injury  Outcome: Ongoing, Progressing  Goal: Optimal Comfort and Wellbeing  Outcome: Ongoing, Progressing  Goal: Readiness for Transition of Care  Outcome: Ongoing, Progressing     Problem: Prelabor Rupture of Membranes  Goal: Delayed Delivery with Absence of Infection  Outcome: Ongoing, Progressing     Problem: Infection  Goal: Absence of Infection Signs and Symptoms  Outcome: Ongoing, Progressing     
Patient ambulates independently, handels transfer with FHR monitor cords and IV pole without risk for fall.  Promoted comfort as best as possible, including grouping nursing tasks together.  Frequent monitor adjustments and position changes did increase nurse time at bedside interfering with patient sleep.  Discussed plans with patient to use IV fluids to increase time between contractions with the goal to delay delivery as long as possible, contractions stopped at this time increasing patient comfort.  Patient remains without elevated HR, fever, or other signs of infection.  Dressing change and long term IV site care performed to aid in preventing infections.  Patient informed about policy regarding IV site and dressing changes.  Verbalized understanding.  
Patient informed of increased risk for infection with PPROM. Patient informed to call RN if feeling constant pressure, need to have a bowel movement, or feel the need to push.   
Patient progressing as expected.  
Plans to dc today  
Length Of Therapy: 1 year
Comments: Pt reported a significant improvement and denies side effects. Will continue
Add High Risk Medication Management Associated Diagnosis?: No
Detail Level: Zone